# Patient Record
Sex: FEMALE | Race: WHITE | Employment: OTHER | URBAN - METROPOLITAN AREA
[De-identification: names, ages, dates, MRNs, and addresses within clinical notes are randomized per-mention and may not be internally consistent; named-entity substitution may affect disease eponyms.]

---

## 2017-02-16 RX ORDER — MEMANTINE HYDROCHLORIDE 5 MG/1
TABLET ORAL
Qty: 60 TABLET | Refills: 0 | Status: SHIPPED | OUTPATIENT
Start: 2017-02-16 | End: 2017-03-10 | Stop reason: SDUPTHER

## 2017-03-12 RX ORDER — DONEPEZIL HYDROCHLORIDE 10 MG/1
TABLET, FILM COATED ORAL
Qty: 30 TABLET | Refills: 5 | Status: SHIPPED | OUTPATIENT
Start: 2017-03-12 | End: 2017-10-04 | Stop reason: SDUPTHER

## 2017-03-12 RX ORDER — MEMANTINE HYDROCHLORIDE 5 MG/1
TABLET ORAL
Qty: 60 TABLET | Refills: 5 | Status: SHIPPED | OUTPATIENT
Start: 2017-03-12 | End: 2017-10-09 | Stop reason: SDUPTHER

## 2017-09-21 ENCOUNTER — OFFICE VISIT (OUTPATIENT)
Dept: FAMILY MEDICINE CLINIC | Age: 82
End: 2017-09-21

## 2017-09-21 VITALS
HEART RATE: 74 BPM | DIASTOLIC BLOOD PRESSURE: 84 MMHG | HEIGHT: 62 IN | BODY MASS INDEX: 23.55 KG/M2 | OXYGEN SATURATION: 97 % | WEIGHT: 128 LBS | RESPIRATION RATE: 18 BRPM | SYSTOLIC BLOOD PRESSURE: 128 MMHG | TEMPERATURE: 98 F

## 2017-09-21 DIAGNOSIS — L85.8 CUTANEOUS HORN: ICD-10-CM

## 2017-09-21 DIAGNOSIS — Z23 NEED FOR INFLUENZA VACCINATION: ICD-10-CM

## 2017-09-21 DIAGNOSIS — J44.9 CHRONIC OBSTRUCTIVE PULMONARY DISEASE, UNSPECIFIED COPD TYPE (HCC): ICD-10-CM

## 2017-09-21 DIAGNOSIS — L23.9 ALLERGIC CONTACT DERMATITIS, UNSPECIFIED TRIGGER: ICD-10-CM

## 2017-09-21 DIAGNOSIS — C44.729 SQUAMOUS CELL CARCINOMA, LEG, LEFT: Primary | ICD-10-CM

## 2017-09-21 PROCEDURE — 99213 OFFICE O/P EST LOW 20 MIN: CPT | Performed by: FAMILY MEDICINE

## 2017-09-21 PROCEDURE — G8926 SPIRO NO PERF OR DOC: HCPCS | Performed by: FAMILY MEDICINE

## 2017-09-21 PROCEDURE — 1123F ACP DISCUSS/DSCN MKR DOCD: CPT | Performed by: FAMILY MEDICINE

## 2017-09-21 PROCEDURE — G0008 ADMIN INFLUENZA VIRUS VAC: HCPCS | Performed by: FAMILY MEDICINE

## 2017-09-21 PROCEDURE — G8399 PT W/DXA RESULTS DOCUMENT: HCPCS | Performed by: FAMILY MEDICINE

## 2017-09-21 PROCEDURE — G8420 CALC BMI NORM PARAMETERS: HCPCS | Performed by: FAMILY MEDICINE

## 2017-09-21 PROCEDURE — 4040F PNEUMOC VAC/ADMIN/RCVD: CPT | Performed by: FAMILY MEDICINE

## 2017-09-21 PROCEDURE — 3023F SPIROM DOC REV: CPT | Performed by: FAMILY MEDICINE

## 2017-09-21 PROCEDURE — G8427 DOCREV CUR MEDS BY ELIG CLIN: HCPCS | Performed by: FAMILY MEDICINE

## 2017-09-21 PROCEDURE — 4004F PT TOBACCO SCREEN RCVD TLK: CPT | Performed by: FAMILY MEDICINE

## 2017-09-21 PROCEDURE — 1090F PRES/ABSN URINE INCON ASSESS: CPT | Performed by: FAMILY MEDICINE

## 2017-09-21 PROCEDURE — 90662 IIV NO PRSV INCREASED AG IM: CPT | Performed by: FAMILY MEDICINE

## 2017-09-21 RX ORDER — MOMETASONE FUROATE 1 MG/G
CREAM TOPICAL
Qty: 45 G | Refills: 2 | Status: ON HOLD | OUTPATIENT
Start: 2017-09-21 | End: 2017-11-20 | Stop reason: ALTCHOICE

## 2017-09-21 RX ORDER — CEPHALEXIN 500 MG/1
500 CAPSULE ORAL 3 TIMES DAILY
Qty: 30 CAPSULE | Refills: 0 | Status: ON HOLD | OUTPATIENT
Start: 2017-09-21 | End: 2017-11-20 | Stop reason: ALTCHOICE

## 2017-09-21 ASSESSMENT — ENCOUNTER SYMPTOMS
RHINORRHEA: 0
COUGH: 0
CHEST TIGHTNESS: 0
RESPIRATORY NEGATIVE: 1
GASTROINTESTINAL NEGATIVE: 1
EYES NEGATIVE: 1

## 2017-10-04 RX ORDER — DONEPEZIL HYDROCHLORIDE 10 MG/1
TABLET, FILM COATED ORAL
Qty: 30 TABLET | Refills: 5 | Status: SHIPPED | OUTPATIENT
Start: 2017-10-04 | End: 2018-03-09 | Stop reason: SDUPTHER

## 2017-10-06 ENCOUNTER — TELEPHONE (OUTPATIENT)
Dept: FAMILY MEDICINE CLINIC | Age: 82
End: 2017-10-06

## 2017-10-06 DIAGNOSIS — C44.90 SKIN CANCER: Primary | ICD-10-CM

## 2017-10-06 NOTE — TELEPHONE ENCOUNTER
Patients' son, Myra Pratt contacted the office indicating he had his mother in for an appointment with Dr. Blanca Eldridge 10- however they waited in the waiting room for approximately 36 - 45 minutes before leaving indicating his elderly mother has incontinence issues and was unable to wait any longer. Myra Pratt indicated other patients' that arrived before them were already waiting upwards to 1 to 1 1/2 hours for their appointment. Please advise an alternative plastic surgeon for skin CA.

## 2017-10-09 RX ORDER — MEMANTINE HYDROCHLORIDE 5 MG/1
TABLET ORAL
Qty: 60 TABLET | Refills: 5 | Status: ON HOLD | OUTPATIENT
Start: 2017-10-09 | End: 2017-11-20 | Stop reason: ALTCHOICE

## 2017-11-20 ENCOUNTER — HOSPITAL ENCOUNTER (OUTPATIENT)
Age: 82
Setting detail: OUTPATIENT SURGERY
Discharge: HOME | End: 2017-11-20
Attending: SURGERY | Admitting: SURGERY
Payer: MEDICARE

## 2017-11-20 VITALS
WEIGHT: 126 LBS | OXYGEN SATURATION: 95 % | DIASTOLIC BLOOD PRESSURE: 69 MMHG | HEIGHT: 62 IN | HEART RATE: 71 BPM | RESPIRATION RATE: 20 BRPM | TEMPERATURE: 100.1 F | SYSTOLIC BLOOD PRESSURE: 157 MMHG | BODY MASS INDEX: 23.19 KG/M2

## 2017-11-20 PROBLEM — C44.99: Chronic | Status: ACTIVE | Noted: 2017-11-20

## 2017-11-20 PROCEDURE — 3600000002 HC SURGERY LEVEL 2 BASE: Performed by: SURGERY

## 2017-11-20 PROCEDURE — 2500000003 HC RX 250 WO HCPCS: Performed by: SURGERY

## 2017-11-20 PROCEDURE — 3600000012 HC SURGERY LEVEL 2 ADDTL 15MIN: Performed by: SURGERY

## 2017-11-20 PROCEDURE — 88342 IMHCHEM/IMCYTCHM 1ST ANTB: CPT

## 2017-11-20 PROCEDURE — A6223 GAUZE >16<=48 NO W/SAL W/O B: HCPCS | Performed by: SURGERY

## 2017-11-20 PROCEDURE — 2580000003 HC RX 258: Performed by: SURGERY

## 2017-11-20 PROCEDURE — 88305 TISSUE EXAM BY PATHOLOGIST: CPT

## 2017-11-20 PROCEDURE — 88341 IMHCHEM/IMCYTCHM EA ADD ANTB: CPT

## 2017-11-20 RX ORDER — MAGNESIUM HYDROXIDE 1200 MG/15ML
LIQUID ORAL CONTINUOUS PRN
Status: DISCONTINUED | OUTPATIENT
Start: 2017-11-20 | End: 2017-11-20 | Stop reason: HOSPADM

## 2017-11-20 RX ORDER — BUPIVACAINE HYDROCHLORIDE 5 MG/ML
INJECTION, SOLUTION EPIDURAL; INTRACAUDAL PRN
Status: DISCONTINUED | OUTPATIENT
Start: 2017-11-20 | End: 2017-11-20 | Stop reason: HOSPADM

## 2017-11-20 RX ORDER — LOPERAMIDE HYDROCHLORIDE 2 MG/1
2 CAPSULE ORAL 4 TIMES DAILY PRN
COMMUNITY
End: 2020-01-08 | Stop reason: SDUPTHER

## 2017-11-20 RX ORDER — LIDOCAINE HYDROCHLORIDE AND EPINEPHRINE 10; 10 MG/ML; UG/ML
INJECTION, SOLUTION INFILTRATION; PERINEURAL PRN
Status: DISCONTINUED | OUTPATIENT
Start: 2017-11-20 | End: 2017-11-20 | Stop reason: HOSPADM

## 2017-11-20 RX ORDER — MEMANTINE HYDROCHLORIDE 21 MG/1
21 CAPSULE, EXTENDED RELEASE ORAL DAILY
Status: ON HOLD | COMMUNITY
End: 2020-02-28

## 2018-02-13 ENCOUNTER — OFFICE VISIT (OUTPATIENT)
Dept: FAMILY MEDICINE CLINIC | Age: 83
End: 2018-02-13
Payer: MEDICARE

## 2018-02-13 VITALS
HEART RATE: 74 BPM | HEIGHT: 62 IN | DIASTOLIC BLOOD PRESSURE: 64 MMHG | TEMPERATURE: 98.1 F | OXYGEN SATURATION: 97 % | SYSTOLIC BLOOD PRESSURE: 112 MMHG | RESPIRATION RATE: 18 BRPM

## 2018-02-13 DIAGNOSIS — J44.9 CHRONIC OBSTRUCTIVE PULMONARY DISEASE, UNSPECIFIED COPD TYPE (HCC): ICD-10-CM

## 2018-02-13 DIAGNOSIS — H61.23 BILATERAL IMPACTED CERUMEN: Primary | ICD-10-CM

## 2018-02-13 DIAGNOSIS — H91.93 BILATERAL HEARING LOSS, UNSPECIFIED HEARING LOSS TYPE: ICD-10-CM

## 2018-02-13 PROCEDURE — 4004F PT TOBACCO SCREEN RCVD TLK: CPT | Performed by: FAMILY MEDICINE

## 2018-02-13 PROCEDURE — 3023F SPIROM DOC REV: CPT | Performed by: FAMILY MEDICINE

## 2018-02-13 PROCEDURE — 1123F ACP DISCUSS/DSCN MKR DOCD: CPT | Performed by: FAMILY MEDICINE

## 2018-02-13 PROCEDURE — G8926 SPIRO NO PERF OR DOC: HCPCS | Performed by: FAMILY MEDICINE

## 2018-02-13 PROCEDURE — G8427 DOCREV CUR MEDS BY ELIG CLIN: HCPCS | Performed by: FAMILY MEDICINE

## 2018-02-13 PROCEDURE — G8484 FLU IMMUNIZE NO ADMIN: HCPCS | Performed by: FAMILY MEDICINE

## 2018-02-13 PROCEDURE — G8399 PT W/DXA RESULTS DOCUMENT: HCPCS | Performed by: FAMILY MEDICINE

## 2018-02-13 PROCEDURE — 1090F PRES/ABSN URINE INCON ASSESS: CPT | Performed by: FAMILY MEDICINE

## 2018-02-13 PROCEDURE — G8420 CALC BMI NORM PARAMETERS: HCPCS | Performed by: FAMILY MEDICINE

## 2018-02-13 PROCEDURE — 99213 OFFICE O/P EST LOW 20 MIN: CPT | Performed by: FAMILY MEDICINE

## 2018-02-13 PROCEDURE — 4040F PNEUMOC VAC/ADMIN/RCVD: CPT | Performed by: FAMILY MEDICINE

## 2018-02-13 ASSESSMENT — ENCOUNTER SYMPTOMS
RESPIRATORY NEGATIVE: 1
COUGH: 0
RHINORRHEA: 0
EYES NEGATIVE: 1
GASTROINTESTINAL NEGATIVE: 1
CHEST TIGHTNESS: 0

## 2018-02-14 NOTE — PROGRESS NOTES
visit. Current Outpatient Prescriptions on File Prior to Visit   Medication Sig Dispense Refill    memantine ER (NAMENDA XR) 21 MG CP24 extended release capsule Take 21 mg by mouth daily      loperamide (IMODIUM) 2 MG capsule Take 2 mg by mouth 4 times daily as needed for Diarrhea      donepezil (ARICEPT) 10 MG tablet TAKE 1 TABLET BY MOUTH DAILY 30 tablet 5     No current facility-administered medications on file prior to visit. Allergies   Allergen Reactions    Penicillins      Health Maintenance   Topic Date Due    DTaP/Tdap/Td vaccine (1 - Tdap) 10/06/1952    Pneumococcal low/med risk (2 of 2 - PCV13) 09/03/2015    Zostavax vaccine  Completed    DEXA (modify frequency per FRAX score)  Addressed    Flu vaccine  Completed       Review of Systems    Review of Systems   Constitutional: Negative for activity change, appetite change, fatigue and fever. HENT: Positive for hearing loss. Negative for congestion and rhinorrhea. Eyes: Negative. Respiratory: Negative. Negative for cough and chest tightness. Cardiovascular: Negative. Gastrointestinal: Negative. Endocrine: Negative. Genitourinary: Negative. Musculoskeletal: Negative. Skin: Negative. Neurological: Negative for dizziness, light-headedness and numbness. Hematological: Negative. Psychiatric/Behavioral: Negative. Physical Exam  Vitals:    02/13/18 1613   BP: 112/64   Pulse: 74   Resp: 18   Temp: 98.1 °F (36.7 °C)   TempSrc: Tympanic   SpO2: 97%   Height: 5' 2\" (1.575 m)       Physical Exam   Constitutional: She appears well-developed and well-nourished. HENT:   Right Ear: External ear normal.   Left Ear: External ear normal.   Ears:    Eyes: Conjunctivae are normal. Pupils are equal, round, and reactive to light. Neck: Normal range of motion. Neck supple. No thyromegaly present. Cardiovascular: Normal rate, regular rhythm and normal heart sounds. No murmur heard.   Pulmonary/Chest: Effort normal and breath sounds normal.   Abdominal: Soft. Bowel sounds are normal. She exhibits no distension. There is no tenderness. Musculoskeletal: Normal range of motion. She exhibits no edema or tenderness. Lymphadenopathy:     She has no cervical adenopathy. Neurological: She is alert. No cranial nerve deficit. Coordination normal.       Assessment  1. Bilateral impacted cerumen     2. Bilateral hearing loss, unspecified hearing loss type     3. Chronic obstructive pulmonary disease, unspecified COPD type ([facility] [address]       Problem List     None          Plan  If hearing loss gets worse consider ent referal  No orders of the defined types were placed in this encounter. No Follow-up on file.   Barbra Madrid MD

## 2018-03-12 RX ORDER — MEMANTINE HYDROCHLORIDE 5 MG/1
TABLET ORAL
Qty: 60 TABLET | Refills: 5 | Status: SHIPPED | OUTPATIENT
Start: 2018-03-12 | End: 2018-08-29 | Stop reason: SDUPTHER

## 2018-03-12 RX ORDER — DONEPEZIL HYDROCHLORIDE 10 MG/1
TABLET, FILM COATED ORAL
Qty: 30 TABLET | Refills: 5 | Status: SHIPPED | OUTPATIENT
Start: 2018-03-12 | End: 2018-09-03 | Stop reason: SDUPTHER

## 2018-08-27 ENCOUNTER — APPOINTMENT (OUTPATIENT)
Dept: GENERAL RADIOLOGY | Age: 83
End: 2018-08-27
Payer: MEDICARE

## 2018-08-27 ENCOUNTER — HOSPITAL ENCOUNTER (EMERGENCY)
Age: 83
Discharge: HOME | End: 2018-08-27
Payer: MEDICARE

## 2018-08-27 ENCOUNTER — APPOINTMENT (OUTPATIENT)
Dept: CT IMAGING | Age: 83
End: 2018-08-27
Payer: MEDICARE

## 2018-08-27 VITALS
DIASTOLIC BLOOD PRESSURE: 88 MMHG | TEMPERATURE: 98.1 F | BODY MASS INDEX: 27.44 KG/M2 | HEART RATE: 81 BPM | SYSTOLIC BLOOD PRESSURE: 138 MMHG | WEIGHT: 150 LBS | OXYGEN SATURATION: 98 % | RESPIRATION RATE: 16 BRPM

## 2018-08-27 DIAGNOSIS — R07.81 RIB PAIN ON LEFT SIDE: ICD-10-CM

## 2018-08-27 DIAGNOSIS — S80.02XA CONTUSION OF LEFT KNEE, INITIAL ENCOUNTER: ICD-10-CM
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
## 2018-08-27 DIAGNOSIS — S60.222A CONTUSION OF LEFT HAND, INITIAL ENCOUNTER: ICD-10-CM

## 2018-08-27 DIAGNOSIS — M25.512 ACUTE PAIN OF LEFT SHOULDER: Primary | ICD-10-CM

## 2018-08-27 PROCEDURE — 73130 X-RAY EXAM OF HAND: CPT

## 2018-08-27 PROCEDURE — 72072 X-RAY EXAM THORAC SPINE 3VWS: CPT

## 2018-08-27 PROCEDURE — 99284 EMERGENCY DEPT VISIT MOD MDM: CPT

## 2018-08-27 PROCEDURE — 73564 X-RAY EXAM KNEE 4 OR MORE: CPT

## 2018-08-27 PROCEDURE — 70450 CT HEAD/BRAIN W/O DYE: CPT

## 2018-08-27 PROCEDURE — 73030 X-RAY EXAM OF SHOULDER: CPT

## 2018-08-27 ASSESSMENT — PAIN DESCRIPTION - PAIN TYPE: TYPE: ACUTE PAIN

## 2018-08-27 ASSESSMENT — PAIN - FUNCTIONAL ASSESSMENT: PAIN_FUNCTIONAL_ASSESSMENT: 0-10

## 2018-08-27 ASSESSMENT — ENCOUNTER SYMPTOMS
BACK PAIN: 0
EYE DISCHARGE: 0
APNEA: 0
VOICE CHANGE: 0
NAUSEA: 0
ABDOMINAL PAIN: 0
VOMITING: 0
ANAL BLEEDING: 0
ABDOMINAL DISTENTION: 0
PHOTOPHOBIA: 0

## 2018-08-27 ASSESSMENT — PAIN DESCRIPTION - LOCATION: LOCATION: SHOULDER

## 2018-08-27 ASSESSMENT — PAIN DESCRIPTION - FREQUENCY: FREQUENCY: CONTINUOUS

## 2018-08-27 ASSESSMENT — PAIN DESCRIPTION - DESCRIPTORS: DESCRIPTORS: THROBBING;NAGGING

## 2018-08-27 ASSESSMENT — PAIN DESCRIPTION - ORIENTATION: ORIENTATION: LEFT

## 2018-08-27 ASSESSMENT — PAIN SCALES - GENERAL
PAINLEVEL_OUTOF10: 8
PAINLEVEL_OUTOF10: 4

## 2018-08-28 NOTE — ED PROVIDER NOTES
Psychiatric/Behavioral: Negative for behavioral problems, self-injury and sleep disturbance. All other systems reviewed and are negative. Except as noted above the remainder of the review of systems was reviewed and negative. PAST MEDICAL HISTORY     Past Medical History:   Diagnosis Date    COPD (chronic obstructive pulmonary disease) ([facility] [address]     GI bleed          SURGICAL HISTORY       Past Surgical History:   Procedure Laterality Date    APPENDECTOMY      CARPAL TUNNEL RELEASE      IN INCIS/DRAIN THIGH/KNEE ABSCESS,DEEP Left 11/20/2017    EXCISION AND LAYERED PLASTIC CLOSURE OF BCC LEFT ANTERIOR PICKENS performed by Anna Fox MD at [address] [place] [address] AND ADENOIDECTOMY           CURRENT MEDICATIONS       Previous Medications    DONEPEZIL (ARICEPT) 10 MG TABLET    TAKE 1 TABLET BY MOUTH DAILY    LOPERAMIDE (IMODIUM) 2 MG CAPSULE    Take 2 mg by mouth 4 times daily as needed for Diarrhea    MEMANTINE (NAMENDA) 5 MG TABLET    TAKE 1 TABLET BY MOUTH TWICE DAILY    MEMANTINE ER (NAMENDA XR) 21 MG CP24 EXTENDED RELEASE CAPSULE    Take 21 mg by mouth daily       ALLERGIES     Penicillins    FAMILY HISTORY       Family History   Problem Relation Age of Onset    Cancer Son         ESOPHAGEAL          SOCIAL HISTORY       Social History     Social History    Marital status:       Spouse name: N/A    Number of children: N/A    Years of education: N/A     Social History Main Topics    Smoking status: Current Every Day Smoker    Smokeless tobacco: Never Used    Alcohol use No    Drug use: No    Sexual activity: Not Asked     Other Topics Concern    None     Social History Narrative    None       SCREENINGS             PHYSICAL EXAM    (up to 7 for level 4, 8 or more for level 5)     ED Triage Vitals [08/27/18 1951]   BP Temp Temp Source Pulse Resp SpO2 Height Weight   (!) 155/97 98.1 °F (36.7 °C) Oral 82 18 98 % -- 150 lb (68 kg)       Physical Exam Constitutional: She is oriented to person, place, and time. She appears well-developed and well-nourished. No distress. HENT:   Head: Normocephalic and atraumatic. Eyes: Pupils are equal, round, and reactive to light. Right eye exhibits no discharge. Left eye exhibits no discharge. Neck: Normal range of motion. Neck supple. Cardiovascular: Normal rate, regular rhythm, normal heart sounds and intact distal pulses. Pulmonary/Chest: Effort normal and breath sounds normal. No stridor. No respiratory distress. Abdominal: Soft. Bowel sounds are normal. She exhibits no distension. Musculoskeletal: She exhibits tenderness. Left proximal humerus slight tenderness bruising noted overlying left fifth and fourth digits. Negative tenderness. Patient noted to have left rib tenderness anteriorly mid thoracic tenderness. Negative cervical tenderness patient has full range of motion neck. Patient has negative tenderness overlying hips she has positive left knee erythema slight abrasion and tenderness. Negative right leg tenderness negative right arm tenderness. Neurological: She is alert and oriented to person, place, and time. Skin: Skin is warm. There is erythema. Psychiatric: She has a normal mood and affect. Nursing note and vitals reviewed.       DIAGNOSTIC RESULTS     EKG: All EKG's are interpreted by the Emergency Department Physician who either signs or Co-signs this chart in the absence of a cardiologist.         RADIOLOGY:   Non-plain film images such as CT, Ultrasound and MRI are read by the radiologist. Plain radiographic images are visualized and preliminarily interpreted by the emergency physician with the below findings:    See rad report    Interpretation per the Radiologist below, if available at the time of this note:    CT Head WO Contrast    (Results Pending)   XR SHOULDER LEFT (MIN 2 VIEWS)    (Results Pending)   XR THORACIC SPINE (3 VIEWS)    (Results Pending)   XR HAND LEFT (MIN 3 VIEWS)    (Results Pending)   XR KNEE LEFT (MIN 4 VIEWS)    (Results Pending)   XR RIBS LEFT INCLUDE CHEST (MIN 3 VIEWS)    (Results Pending)         ED BEDSIDE ULTRASOUND:   Performed by ED Physician - none    LABS:  Labs Reviewed - No data to display    All other labs were within normal range or not returned as of this dictation. EMERGENCY DEPARTMENT COURSE and DIFFERENTIAL DIAGNOSIS/MDM:   Vitals:    Vitals:    08/27/18 1951 08/27/18 2214   BP: (!) 155/97 138/88   Pulse: 82 81   Resp: 18 16   Temp: 98.1 °F (36.7 °C)    TempSrc: Oral    SpO2: 98% 98%   Weight: 150 lb (68 kg)             MDM  Number of Diagnoses or Management Options  Acute pain of left shoulder:   Contusion of left hand, initial encounter:   Contusion of left knee, initial encounter:   Fall, initial encounter:   Rib pain on left side:   Diagnosis management comments: Family notes patient has dementia. Given patient's age unwitnessed fall will CAT scan had x-rays. Ordered rib series patient went to x-ray. X-ray patient refused that his chest or shoulder. We'll discuss with family including falls precautions follow-up with primary care physician and return to ER if any symptoms worsen or new symptoms develop discussed with Dr. Annamarie Oliva. . S with patient family they note that they're taking her to internal medicine on Wednesday to discuss her current condition and discuss options for care. We discussed follow-up with orthopedics return to ER if any symptoms worsen or new symptoms develop. Family also notes the power of  notes that they're aware that she refused rib x-rays. Amount and/or Complexity of Data Reviewed  Tests in the radiology section of CPT®: ordered and reviewed  Obtain history from someone other than the patient: yes  Discuss the patient with other providers: yes        CRITICAL CARE TIME   Total Critical Care time was   minutes, excluding separately reportable procedures.   There was a high probability of

## 2018-08-28 NOTE — ED NOTES
Sammy High (Granddaughter): [phone] Wants to be called if patient becomes difficult to handle. Sophie Marrufo RN  08/27/18 2027

## 2018-08-29 ENCOUNTER — CARE COORDINATION (OUTPATIENT)
Dept: CARE COORDINATION | Age: 83
End: 2018-08-29

## 2018-08-29 ENCOUNTER — OFFICE VISIT (OUTPATIENT)
Dept: INTERNAL MEDICINE CLINIC | Age: 83
End: 2018-08-29
Payer: MEDICARE

## 2018-08-29 VITALS
DIASTOLIC BLOOD PRESSURE: 78 MMHG | HEART RATE: 73 BPM | RESPIRATION RATE: 18 BRPM | SYSTOLIC BLOOD PRESSURE: 130 MMHG | TEMPERATURE: 98.5 F | OXYGEN SATURATION: 95 % | HEIGHT: 62 IN | WEIGHT: 127 LBS | BODY MASS INDEX: 23.37 KG/M2

## 2018-08-29 DIAGNOSIS — R07.81 RIB PAIN ON LEFT SIDE: Primary | ICD-10-CM

## 2018-08-29 DIAGNOSIS — Z91.81 AT HIGH RISK FOR FALLS: ICD-10-CM

## 2018-08-29 DIAGNOSIS — F02.80 LATE ONSET ALZHEIMER'S DISEASE WITHOUT BEHAVIORAL DISTURBANCE (HCC): ICD-10-CM

## 2018-08-29 DIAGNOSIS — G30.1 LATE ONSET ALZHEIMER'S DISEASE WITHOUT BEHAVIORAL DISTURBANCE (HCC): ICD-10-CM

## 2018-08-29 DIAGNOSIS — J44.9 CHRONIC OBSTRUCTIVE PULMONARY DISEASE, UNSPECIFIED COPD TYPE (HCC): ICD-10-CM

## 2018-08-29 DIAGNOSIS — Z23 NEED FOR SHINGLES VACCINE: ICD-10-CM

## 2018-08-29 PROCEDURE — G8926 SPIRO NO PERF OR DOC: HCPCS | Performed by: FAMILY MEDICINE

## 2018-08-29 PROCEDURE — 1090F PRES/ABSN URINE INCON ASSESS: CPT | Performed by: FAMILY MEDICINE

## 2018-08-29 PROCEDURE — 4040F PNEUMOC VAC/ADMIN/RCVD: CPT | Performed by: FAMILY MEDICINE

## 2018-08-29 PROCEDURE — 1101F PT FALLS ASSESS-DOCD LE1/YR: CPT | Performed by: FAMILY MEDICINE

## 2018-08-29 PROCEDURE — 1123F ACP DISCUSS/DSCN MKR DOCD: CPT | Performed by: FAMILY MEDICINE

## 2018-08-29 PROCEDURE — G8399 PT W/DXA RESULTS DOCUMENT: HCPCS | Performed by: FAMILY MEDICINE

## 2018-08-29 PROCEDURE — G8420 CALC BMI NORM PARAMETERS: HCPCS | Performed by: FAMILY MEDICINE

## 2018-08-29 PROCEDURE — 4004F PT TOBACCO SCREEN RCVD TLK: CPT | Performed by: FAMILY MEDICINE

## 2018-08-29 PROCEDURE — 3023F SPIROM DOC REV: CPT | Performed by: FAMILY MEDICINE

## 2018-08-29 PROCEDURE — 99213 OFFICE O/P EST LOW 20 MIN: CPT | Performed by: FAMILY MEDICINE

## 2018-08-29 PROCEDURE — G8427 DOCREV CUR MEDS BY ELIG CLIN: HCPCS | Performed by: FAMILY MEDICINE

## 2018-08-29 RX ORDER — MEMANTINE HYDROCHLORIDE 10 MG/1
10 TABLET ORAL 2 TIMES DAILY
Qty: 60 TABLET | Refills: 5 | Status: SHIPPED | OUTPATIENT
Start: 2018-08-29 | End: 2019-02-21 | Stop reason: SDUPTHER

## 2018-08-29 ASSESSMENT — PATIENT HEALTH QUESTIONNAIRE - PHQ9
SUM OF ALL RESPONSES TO PHQ9 QUESTIONS 1 & 2: 0
SUM OF ALL RESPONSES TO PHQ QUESTIONS 1-9: 0
SUM OF ALL RESPONSES TO PHQ QUESTIONS 1-9: 0
2. FEELING DOWN, DEPRESSED OR HOPELESS: 0
1. LITTLE INTEREST OR PLEASURE IN DOING THINGS: 0

## 2018-08-29 ASSESSMENT — ENCOUNTER SYMPTOMS
RESPIRATORY NEGATIVE: 1
CHEST TIGHTNESS: 0
EYES NEGATIVE: 1
COUGH: 0
GASTROINTESTINAL NEGATIVE: 1
RHINORRHEA: 0

## 2018-08-29 NOTE — CARE COORDINATION
[address]  Telephone call to [phone] [facility]. Relayed patient information needed for coordination of nursing home placement. Rodney Casey relayed typically Medicaid has to be approved before admission occurs. She asked that son contact her for discussion of placement of patient.

## 2018-08-29 NOTE — PROGRESS NOTES
Patient is seen in follow up for   Chief Complaint   Patient presents with    Diarrhea     POA , Son and ex-daughter in law here stating patient has been having bouts of diarrhea that is worsening     Health Maintenance     prevnar and shingrix ordered     Follow-Up from Hospital     Patient went to ER on 8/27/18- she fell. Unsure how she fell. Hurt her left shoulder. POA states patient refused the ER to xray her ribs.  Other     GAP      HPI here for follow up after fall with shoulder pain and rib pain. Past Medical History:   Diagnosis Date    COPD [facility] obstructive [facility] disease) [facility] [address]     GI bleed      Patient Active Problem List    Diagnosis Date Noted    Carcinoma of skin 11/20/2017    Stool incontinence 06/04/2014    Tobacco abuse 03/24/2014    Lactase deficiency 02/10/2014    Ataxia 11/11/2013    Tremor 11/11/2013    History of GI bleed 08/19/2013    Chronic bronchitis ([facility] [address] 05/13/2013    Alzheimer's dementia 04/30/2012    B12 deficiency 04/30/2012    Chronic diarrhea 04/30/2012    COPD (chronic obstructive pulmonary disease) ([facility] [address]      Past Surgical History:   Procedure Laterality Date    APPENDECTOMY      CARPAL TUNNEL RELEASE      MT INCIS/DRAIN THIGH/KNEE ABSCESS,DEEP Left 11/20/2017    EXCISION AND LAYERED PLASTIC CLOSURE OF BCC LEFT ANTERIOR PICKENS performed by Tori Brock MD at [address] [place] [address]       Family History   Problem Relation Age of Onset    Cancer Son         ESOPHAGEAL     Social History     Social History    Marital status:       Spouse name: N/A    Number of children: N/A    Years of education: N/A     Social History Main Topics    Smoking status: Current Every Day Smoker    Smokeless tobacco: Never Used    Alcohol use No    Drug use: No    Sexual activity: Not Asked     Other Topics Concern    None     Social History Narrative    None     Current Outpatient Prescriptions   Medication Sig Question:   Reason for exam:     Answer:   chest wall pain    XR CHEST STANDARD (2 VW)     Standing Status:   Future     Standing Expiration Date:   8/29/2019     Order Specific Question:   Reason for exam:     Answer:   fall with left rib pain    PREVNAR 13 IM (Pneumococcal conjugate vaccine 13-valent)     Orders Placed This Encounter   Medications    zoster recombinant adjuvanted vaccine (SHINGRIX) 50 MCG SUSR injection     Sig: Inject 0.5 mLs into the muscle once for 1 dose     Dispense:  0.5 mL     Refill:  1    memantine (NAMENDA) 10 MG tablet     Sig: Take 1 tablet by mouth 2 times daily     Dispense:  60 tablet     Refill:  5     Family wants patient placed in a  nursing home will discuss with the .   Junior Augustus MD

## 2018-08-30 ENCOUNTER — CARE COORDINATION (OUTPATIENT)
Dept: CARE COORDINATION | Age: 83
End: 2018-08-30

## 2018-08-30 NOTE — CARE COORDINATION
0900  Met with son/Hemant today at this writer's office. Provided son with Medicaid application and list of long-term care facilities. He clarified that patient is in need of long-term care permanently. Educated son to Medicaid process. Encouraged on to contact Nancy Murrieta at [facility]. Family can no longer provide care for patient. Son asking what if patient refuses to go to a facility. Discussed that [facility] would not be enough to keep patient in a long-term facility. Discussed need for guardianship to maintain patient in long-term facility. Laura Tariq discussed plan to contact Adult Protective Services to discuss  Past money issue with daughter. Also, encouraged son to discuss issue with guardianship with Adult Protective Services. Patient has lifeline in place but son feels she would not activate system for assistance.

## 2018-09-04 RX ORDER — DONEPEZIL HYDROCHLORIDE 10 MG/1
TABLET, FILM COATED ORAL
Qty: 30 TABLET | Refills: 5 | Status: SHIPPED | OUTPATIENT
Start: 2018-09-04 | End: 2019-03-21 | Stop reason: SDUPTHER

## 2018-09-21 ENCOUNTER — CARE COORDINATION (OUTPATIENT)
Dept: CARE COORDINATION | Age: 83
End: 2018-09-21

## 2018-10-23 ENCOUNTER — CARE COORDINATION (OUTPATIENT)
Dept: CARE COORDINATION | Age: 83
End: 2018-10-23

## 2018-10-23 NOTE — CARE COORDINATION
Telephone call to son/Hemant. Left voicemail of nature of call with request for return call. Call back number was provided.

## 2018-11-01 ENCOUNTER — CARE COORDINATION (OUTPATIENT)
Dept: CARE COORDINATION | Age: 83
End: 2018-11-01

## 2018-11-25 ENCOUNTER — CARE COORDINATION (OUTPATIENT)
Dept: CARE COORDINATION | Age: 83
End: 2018-11-25

## 2018-12-06 ENCOUNTER — CARE COORDINATION (OUTPATIENT)
Dept: CARE COORDINATION | Age: 83
End: 2018-12-06

## 2019-02-06 ENCOUNTER — CARE COORDINATION (OUTPATIENT)
Dept: CARE COORDINATION | Age: 84
End: 2019-02-06

## 2019-02-21 RX ORDER — MEMANTINE HYDROCHLORIDE 10 MG/1
10 TABLET ORAL 2 TIMES DAILY
Qty: 60 TABLET | Refills: 3 | Status: SHIPPED | OUTPATIENT
Start: 2019-02-21 | End: 2019-06-09 | Stop reason: SDUPTHER

## 2019-03-21 RX ORDER — DONEPEZIL HYDROCHLORIDE 10 MG/1
TABLET, FILM COATED ORAL
Qty: 30 TABLET | Refills: 5 | Status: SHIPPED | OUTPATIENT
Start: 2019-03-21 | End: 2019-09-15 | Stop reason: SDUPTHER

## 2019-03-29 ENCOUNTER — CARE COORDINATION (OUTPATIENT)
Dept: CARE COORDINATION | Age: 84
End: 2019-03-29

## 2019-06-10 RX ORDER — MEMANTINE HYDROCHLORIDE 10 MG/1
10 TABLET ORAL 2 TIMES DAILY
Qty: 60 TABLET | Refills: 3 | Status: SHIPPED | OUTPATIENT
Start: 2019-06-10 | End: 2019-11-01 | Stop reason: SDUPTHER

## 2019-06-10 NOTE — TELEPHONE ENCOUNTER
Requesting medication refill. Please approve or deny this request.    Rx requested:  Requested Prescriptions     Pending Prescriptions Disp Refills    memantine (NAMENDA) 10 MG tablet [Pharmacy Med Name: MEMANTINE HCL 10 MG TABLET] 60 tablet 3     Sig: Take 1 tablet by mouth 2 times daily       Last Office Visit:   8/29/2018    Last Labs:      Next Visit Date:  No future appointments.

## 2019-09-16 RX ORDER — DONEPEZIL HYDROCHLORIDE 10 MG/1
TABLET, FILM COATED ORAL
Qty: 30 TABLET | Refills: 0 | Status: SHIPPED | OUTPATIENT
Start: 2019-09-16 | End: 2019-10-09 | Stop reason: SDUPTHER

## 2019-10-09 RX ORDER — DONEPEZIL HYDROCHLORIDE 10 MG/1
TABLET, FILM COATED ORAL
Qty: 30 TABLET | Refills: 0 | Status: SHIPPED | OUTPATIENT
Start: 2019-10-09 | End: 2019-12-17 | Stop reason: SDUPTHER

## 2019-11-01 RX ORDER — MEMANTINE HYDROCHLORIDE 10 MG/1
10 TABLET ORAL 2 TIMES DAILY
Qty: 60 TABLET | Refills: 0 | Status: SHIPPED | OUTPATIENT
Start: 2019-11-01 | End: 2019-12-17 | Stop reason: SDUPTHER

## 2019-12-04 RX ORDER — MEMANTINE HYDROCHLORIDE 10 MG/1
10 TABLET ORAL 2 TIMES DAILY
Qty: 60 TABLET | Refills: 0 | OUTPATIENT
Start: 2019-12-04

## 2019-12-04 RX ORDER — DONEPEZIL HYDROCHLORIDE 10 MG/1
TABLET, FILM COATED ORAL
Qty: 30 TABLET | Refills: 0 | OUTPATIENT
Start: 2019-12-04

## 2019-12-17 RX ORDER — MEMANTINE HYDROCHLORIDE 10 MG/1
10 TABLET ORAL 2 TIMES DAILY
Qty: 60 TABLET | Refills: 0 | Status: SHIPPED | OUTPATIENT
Start: 2019-12-17 | End: 2019-12-23

## 2019-12-17 RX ORDER — DONEPEZIL HYDROCHLORIDE 10 MG/1
TABLET, FILM COATED ORAL
Qty: 30 TABLET | Refills: 5 | Status: SHIPPED | OUTPATIENT
Start: 2019-12-17 | End: 2020-01-08 | Stop reason: SDUPTHER

## 2019-12-23 RX ORDER — MEMANTINE HYDROCHLORIDE 10 MG/1
10 TABLET ORAL 2 TIMES DAILY
Qty: 60 TABLET | Refills: 0 | Status: SHIPPED | OUTPATIENT
Start: 2019-12-23 | End: 2020-01-08 | Stop reason: SDUPTHER

## 2019-12-30 ENCOUNTER — TELEPHONE (OUTPATIENT)
Dept: FAMILY MEDICINE CLINIC | Age: 84
End: 2019-12-30

## 2020-01-08 ENCOUNTER — OFFICE VISIT (OUTPATIENT)
Dept: FAMILY MEDICINE CLINIC | Age: 85
End: 2020-01-08
Payer: MEDICARE

## 2020-01-08 VITALS
HEART RATE: 62 BPM | OXYGEN SATURATION: 96 % | TEMPERATURE: 97.9 F | SYSTOLIC BLOOD PRESSURE: 110 MMHG | DIASTOLIC BLOOD PRESSURE: 70 MMHG

## 2020-01-08 VITALS
OXYGEN SATURATION: 96 % | HEART RATE: 62 BPM | DIASTOLIC BLOOD PRESSURE: 70 MMHG | BODY MASS INDEX: 23.23 KG/M2 | SYSTOLIC BLOOD PRESSURE: 110 MMHG | HEIGHT: 62 IN | TEMPERATURE: 97.9 F | RESPIRATION RATE: 16 BRPM

## 2020-01-08 DIAGNOSIS — G30.1 LATE ONSET ALZHEIMER'S DISEASE WITHOUT BEHAVIORAL DISTURBANCE (HCC): ICD-10-CM

## 2020-01-08 DIAGNOSIS — F02.80 LATE ONSET ALZHEIMER'S DISEASE WITHOUT BEHAVIORAL DISTURBANCE (HCC): ICD-10-CM

## 2020-01-08 DIAGNOSIS — J44.9 CHRONIC OBSTRUCTIVE PULMONARY DISEASE, UNSPECIFIED COPD TYPE (HCC): ICD-10-CM

## 2020-01-08 LAB
ALBUMIN SERPL-MCNC: 3.2 G/DL (ref 3.5–4.6)
ALP BLD-CCNC: 74 U/L (ref 40–130)
ALT SERPL-CCNC: 18 U/L (ref 0–33)
ANION GAP SERPL CALCULATED.3IONS-SCNC: 12 MEQ/L (ref 9–15)
AST SERPL-CCNC: 36 U/L (ref 0–35)
BASOPHILS ABSOLUTE: 0.1 K/UL (ref 0–0.2)
BASOPHILS RELATIVE PERCENT: 0.8 %
BILIRUB SERPL-MCNC: 0.3 MG/DL (ref 0.2–0.7)
BUN BLDV-MCNC: 20 MG/DL (ref 8–23)
CALCIUM SERPL-MCNC: 9 MG/DL (ref 8.5–9.9)
CHLORIDE BLD-SCNC: 101 MEQ/L (ref 95–107)
CO2: 23 MEQ/L (ref 20–31)
CREAT SERPL-MCNC: 0.98 MG/DL (ref 0.5–0.9)
EOSINOPHILS ABSOLUTE: 0.3 K/UL (ref 0–0.7)
EOSINOPHILS RELATIVE PERCENT: 4 %
GFR AFRICAN AMERICAN: >60
GFR NON-AFRICAN AMERICAN: 53.8
GLOBULIN: 3.3 G/DL (ref 2.3–3.5)
GLUCOSE BLD-MCNC: 112 MG/DL (ref 70–99)
HCT VFR BLD CALC: 44.4 % (ref 37–47)
HEMOGLOBIN: 14.6 G/DL (ref 12–16)
LYMPHOCYTES ABSOLUTE: 0.6 K/UL (ref 1–4.8)
LYMPHOCYTES RELATIVE PERCENT: 8.8 %
MCH RBC QN AUTO: 28.7 PG (ref 27–31.3)
MCHC RBC AUTO-ENTMCNC: 32.8 % (ref 33–37)
MCV RBC AUTO: 87.5 FL (ref 82–100)
MONOCYTES ABSOLUTE: 0.5 K/UL (ref 0.2–0.8)
MONOCYTES RELATIVE PERCENT: 7 %
NEUTROPHILS ABSOLUTE: 5.2 K/UL (ref 1.4–6.5)
NEUTROPHILS RELATIVE PERCENT: 79.4 %
PDW BLD-RTO: 15.4 % (ref 11.5–14.5)
PLATELET # BLD: 230 K/UL (ref 130–400)
POTASSIUM SERPL-SCNC: 5.1 MEQ/L (ref 3.4–4.9)
RBC # BLD: 5.07 M/UL (ref 4.2–5.4)
SODIUM BLD-SCNC: 136 MEQ/L (ref 135–144)
TOTAL PROTEIN: 6.5 G/DL (ref 6.3–8)
TSH SERPL DL<=0.05 MIU/L-ACNC: 2.75 UIU/ML (ref 0.44–3.86)
WBC # BLD: 6.6 K/UL (ref 4.8–10.8)

## 2020-01-08 PROCEDURE — 4040F PNEUMOC VAC/ADMIN/RCVD: CPT | Performed by: FAMILY MEDICINE

## 2020-01-08 PROCEDURE — G0438 PPPS, INITIAL VISIT: HCPCS | Performed by: NURSE PRACTITIONER

## 2020-01-08 PROCEDURE — G0008 ADMIN INFLUENZA VIRUS VAC: HCPCS | Performed by: NURSE PRACTITIONER

## 2020-01-08 PROCEDURE — 90653 IIV ADJUVANT VACCINE IM: CPT | Performed by: NURSE PRACTITIONER

## 2020-01-08 PROCEDURE — G8482 FLU IMMUNIZE ORDER/ADMIN: HCPCS | Performed by: FAMILY MEDICINE

## 2020-01-08 PROCEDURE — G8926 SPIRO NO PERF OR DOC: HCPCS | Performed by: FAMILY MEDICINE

## 2020-01-08 PROCEDURE — G8510 SCR DEP NEG, NO PLAN REQD: HCPCS | Performed by: FAMILY MEDICINE

## 2020-01-08 PROCEDURE — 1123F ACP DISCUSS/DSCN MKR DOCD: CPT | Performed by: NURSE PRACTITIONER

## 2020-01-08 PROCEDURE — G8420 CALC BMI NORM PARAMETERS: HCPCS | Performed by: FAMILY MEDICINE

## 2020-01-08 PROCEDURE — 3023F SPIROM DOC REV: CPT | Performed by: FAMILY MEDICINE

## 2020-01-08 PROCEDURE — G8482 FLU IMMUNIZE ORDER/ADMIN: HCPCS | Performed by: NURSE PRACTITIONER

## 2020-01-08 PROCEDURE — 1123F ACP DISCUSS/DSCN MKR DOCD: CPT | Performed by: FAMILY MEDICINE

## 2020-01-08 PROCEDURE — 4004F PT TOBACCO SCREEN RCVD TLK: CPT | Performed by: FAMILY MEDICINE

## 2020-01-08 PROCEDURE — 4040F PNEUMOC VAC/ADMIN/RCVD: CPT | Performed by: NURSE PRACTITIONER

## 2020-01-08 PROCEDURE — 1090F PRES/ABSN URINE INCON ASSESS: CPT | Performed by: FAMILY MEDICINE

## 2020-01-08 PROCEDURE — 99214 OFFICE O/P EST MOD 30 MIN: CPT | Performed by: FAMILY MEDICINE

## 2020-01-08 PROCEDURE — G8427 DOCREV CUR MEDS BY ELIG CLIN: HCPCS | Performed by: FAMILY MEDICINE

## 2020-01-08 RX ORDER — MEMANTINE HYDROCHLORIDE 10 MG/1
10 TABLET ORAL 2 TIMES DAILY
Qty: 60 TABLET | Refills: 5 | Status: SHIPPED | OUTPATIENT
Start: 2020-01-08

## 2020-01-08 RX ORDER — LOPERAMIDE HYDROCHLORIDE 2 MG/1
2 CAPSULE ORAL 4 TIMES DAILY PRN
Qty: 60 CAPSULE | Refills: 5 | Status: SHIPPED | OUTPATIENT
Start: 2020-01-08

## 2020-01-08 RX ORDER — MEMANTINE HYDROCHLORIDE 21 MG/1
21 CAPSULE, EXTENDED RELEASE ORAL DAILY
Status: CANCELLED | OUTPATIENT
Start: 2020-01-08

## 2020-01-08 RX ORDER — DONEPEZIL HYDROCHLORIDE 10 MG/1
TABLET, FILM COATED ORAL
Qty: 30 TABLET | Refills: 5 | Status: SHIPPED | OUTPATIENT
Start: 2020-01-08

## 2020-01-08 ASSESSMENT — ENCOUNTER SYMPTOMS
COUGH: 0
SHORTNESS OF BREATH: 0
GASTROINTESTINAL NEGATIVE: 1
RESPIRATORY NEGATIVE: 1
RHINORRHEA: 0
CHEST TIGHTNESS: 0
EYES NEGATIVE: 1

## 2020-01-08 ASSESSMENT — VISUAL ACUITY
OD_CC: 20/30
OS_CC: 20/30

## 2020-01-08 ASSESSMENT — PATIENT HEALTH QUESTIONNAIRE - PHQ9
SUM OF ALL RESPONSES TO PHQ9 QUESTIONS 1 & 2: 2
1. LITTLE INTEREST OR PLEASURE IN DOING THINGS: 1
SUM OF ALL RESPONSES TO PHQ QUESTIONS 1-9: 0
SUM OF ALL RESPONSES TO PHQ QUESTIONS 1-9: 2
2. FEELING DOWN, DEPRESSED OR HOPELESS: 1
SUM OF ALL RESPONSES TO PHQ QUESTIONS 1-9: 2
SUM OF ALL RESPONSES TO PHQ QUESTIONS 1-9: 0

## 2020-01-08 ASSESSMENT — LIFESTYLE VARIABLES: HOW OFTEN DO YOU HAVE A DRINK CONTAINING ALCOHOL: 0

## 2020-01-08 ASSESSMENT — COPD QUESTIONNAIRES: COPD: 1

## 2020-01-08 NOTE — PROGRESS NOTES
Medicare Annual Wellness Visit  Name: Wiley Hatchet Date: 2020   MRN: [id number] Sex: Female   Age: 80 y.o. Ethnicity: Non-/Non    : 10/6/1933 Race: Lety Gillespie is here for Medicare AWV    Screenings for behavioral, psychosocial and functional/safety risks, and cognitive dysfunction are all negative except as indicated below. These results, as well as other patient data from the [address] form, are documented in Flowsheets linked to this Encounter. Allergies   Allergen Reactions    Penicillins          Prior to Visit Medications    Medication Sig Taking?  Authorizing Provider   memantine ER (NAMENDA XR) 21 MG CP24 extended release capsule Take 21 mg by mouth daily Yes Historical Provider, MD   memantine (NAMENDA) 10 MG tablet Take 1 tablet by mouth 2 times daily  Natalya Arroyo MD   donepezil (ARICEPT) 10 MG tablet TAKE 1 TABLET BY MOUTH DAILY  Natalya Arroyo MD   loperamide (IMODIUM) 2 MG capsule Take 1 capsule by mouth 4 times daily as needed for Diarrhea  Natalya Arroyo MD         Past Medical History:   Diagnosis Date    [facility] obstructive [facility] disease) [facility] [address]     GI bleed        Past Surgical History:   Procedure Laterality Date    APPENDECTOMY      CARPAL TUNNEL RELEASE      ID INCIS/DRAIN THIGH/KNEE ABSCESS,DEEP Left 2017    EXCISION AND LAYERED PLASTIC CLOSURE OF [facility] LEFT ANTERIOR PICKENS performed by Mariam Wilder MD at [facility]      TONSILLECTOMY AND ADENOIDECTOMY           Family History   Problem Relation Age of Onset    Cancer Son         ESOPHAGEAL       CareTeam (Including outside providers/suppliers regularly involved in providing care):   Patient Care Team:  Natalya Arroyo MD as PCP - General (Family Medicine)  Natalya Arroyo MD as PCP - [facility]    Wt Readings from Last 3 Encounters:   18 127 lb (57.6 kg)   18 150 lb (68 kg)   17 126 lb (57.2 kg)     Vitals:    20 0946   BP: 110/70   Site: Left Upper Arm   Position: Sitting   Cuff Size: Medium Adult   Pulse: 62   Resp: 16   Temp: 97.9 °F (36.6 °C)   TempSrc: Oral   SpO2: 96%   Height: 5' 2\" (1.575 m)     Body mass index is 23.23 kg/m². Based upon direct observation of the patient, evaluation of cognition reveals global memory impairment noted. Patient has dementia. Son has POA and is present during appointment. Provides information for patient. Patient's complete Health Risk Assessment and screening values have been reviewed and are found in Flowsheets. The following problems were reviewed today and where indicated follow up appointments were made and/or referrals ordered. Positive Risk Factor Screenings with Interventions:     Fall Risk:  Timed Up and Go Test > 12 seconds? (Complete if either Fall Risk answers are Yes): no  2 or more falls in past year?: (!) yes  Fall with injury in past year?: no  Fall Risk Interventions:    · Patient declines any further evaluation/treatment for this issue    Substance Abuse:  Social History     Tobacco History     Smoking Status  Current Every Day Smoker    Smokeless Tobacco Use  Never Used          Alcohol History     Alcohol Use Status  No          Drug Use     Drug Use Status  No          Sexual Activity     Sexually Active  Not Asked               Audit Questionnaire: Screen for Alcohol Misuse  How often do you have a drink containing alcohol?: Never  Substance Abuse Interventions:  · Tobacco abuse:  patient is not ready to work toward tobacco cessation at this time    Health Habits/Nutrition:  Health Habits/Nutrition  Do you exercise for at least 20 minutes 2-3 times per week?: (!) No  Have you lost any weight without trying in the past 3 months?: No  Do you eat fewer than 2 meals per day?: No  Have you seen a dentist within the past year?: (!) No  Body mass index is 23.23 kg/m².   Health Habits/Nutrition Interventions:  · Inadequate physical activity:  patient is not ready to increase his/her physical activity level at this time  · Dental exam overdue:  patient declines dental evaluation    Hearing/Vision:   Visual Acuity Screening    Right eye Left eye Both eyes   Without correction:      With correction: 20/30 20/30 20/30     Hearing/Vision  Do you or your family notice any trouble with your hearing?: (!) Yes  Do you have difficulty driving, watching TV, or doing any of your daily activities because of your eyesight?: (!) Yes  Have you had an eye exam within the past year?: (!) No  Hearing/Vision Interventions:  · Hearing concerns:  patient declines any further evaluation/treatment for hearing issues  · Vision concerns:  patient declines any further evaluation/treatment for this issue    ADL:  ADLs  In the past 7 days, did you need help from others to perform any of the following everyday activities? Eating, dressing, grooming, bathing, toileting, or walking/balance?: (!) Dressing, Grooming, Bathing, Walking/Balance  In the past 7 days, did you need help from others to take care of any of the following?  Laundry, housekeeping, banking/finances, shopping, telephone use, food preparation, transportation, or taking medications?: (!) Laundry, Housekeeping, Banking/Finances, Shopping, Food Preparation, Transportation, Taking Medications  ADL Interventions:  · Patient declines any further evaluation/treatment for this issue  · Family has care set up for patient declines any concerns    Personalized Preventive Plan   Current Health Maintenance Status  Immunization History   Administered Date(s) Administered    Influenza 11/11/2013    Influenza Vaccine, unspecified formulation 11/11/2013, 10/30/2014, 12/09/2015    Influenza, High Dose (Fluzone 65 yrs and older) 12/09/2015, 09/21/2017    Influenza, Triv, inactivated, subunit, adjuvanted, IM (Fluad 65 yrs and older) 01/08/2020    Pneumococcal Polysaccharide ([id number]) 09/03/2014    Zoster Live (Zostavax) 08/25/2014        Health Maintenance   Topic Date Due    DTaP/Tdap/Td vaccine (1 - Tdap) 10[birth year]/1944    Shingles Vaccine (2 of 3) 10/20/2014    Annual Wellness Visit (AWV)  06/20/2019    Flu vaccine  Completed    Pneumococcal 65+ years Vaccine  Completed     Recommendations for Preventive Services Due: see orders and patient instructions/AVS.  . Recommended screening schedule for the next 5-10 years is provided to the patient in written form: see Patient Severa Andrea was seen today for medicare awv.     Diagnoses and all orders for this visit:    Routine general medical examination at a health care facility    Need for influenza vaccination  -     INFLUENZA, TRIV, INACTIVATED, SUBUNIT, ADJUVANTED, 65 YRS AND OLDER, IM, PREFILL SYR, 0.5ML (FLUAD TRIV)

## 2020-01-08 NOTE — PATIENT INSTRUCTIONS
Personalized Preventive Plan for Clinton Woodard - 1/8/2020  Medicare offers a range of preventive health benefits. Some of the tests and screenings are paid in full while other may be subject to a deductible, co-insurance, and/or copay. Some of these benefits include a comprehensive review of your medical history including lifestyle, illnesses that may run in your family, and various assessments and screenings as appropriate. After reviewing your medical record and screening and assessments performed today your provider may have ordered immunizations, labs, imaging, and/or referrals for you. A list of these orders (if applicable) as well as your Preventive Care list are included within your After Visit Summary for your review. Other Preventive Recommendations:    · A preventive eye exam performed by an eye specialist is recommended every 1-2 years to screen for glaucoma; cataracts, macular degeneration, and other eye disorders. · A preventive dental visit is recommended every 6 months. · Try to get at least 150 minutes of exercise per week or 10,000 steps per day on a pedometer . · Order or download the FREE \"Exercise & Physical Activity: Your Everyday Guide\" from [employer]. Call 1-984.406.2094 or search [employer] online. · You need 7980-5887 mg of calcium and 0150-6384 IU of vitamin D per day. It is possible to meet your calcium requirement with diet alone, but a vitamin D supplement is usually necessary to meet this goal.  · When exposed to the sun, use a sunscreen that protects against both UVA and UVB radiation with an SPF of 30 or greater. Reapply every 2 to 3 hours or after sweating, drying off with a towel, or swimming. · Always wear a seat belt when traveling in a car. Always wear a helmet when riding a bicycle or motorcycle.

## 2020-02-25 ENCOUNTER — APPOINTMENT (OUTPATIENT)
Dept: GENERAL RADIOLOGY | Age: 85
DRG: 177 | End: 2020-02-25
Payer: MEDICARE

## 2020-02-25 ENCOUNTER — HOSPITAL ENCOUNTER (INPATIENT)
Age: 85
LOS: 3 days | Discharge: NURSING, REHAB OR LONG-TERM CARE | DRG: 177 | End: 2020-02-28
Attending: STUDENT IN AN ORGANIZED HEALTH CARE EDUCATION/TRAINING PROGRAM | Admitting: INTERNAL MEDICINE
Payer: MEDICARE

## 2020-02-25 ENCOUNTER — APPOINTMENT (OUTPATIENT)
Dept: CT IMAGING | Age: 85
DRG: 177 | End: 2020-02-25
Payer: MEDICARE

## 2020-02-25 PROBLEM — J18.9 PNEUMONIA: Status: ACTIVE | Noted: 2020-02-25

## 2020-02-25 LAB
ALBUMIN SERPL-MCNC: 2.9 G/DL (ref 3.5–4.6)
ALP BLD-CCNC: 185 U/L (ref 40–130)
ALT SERPL-CCNC: 125 U/L (ref 0–33)
ANION GAP SERPL CALCULATED.3IONS-SCNC: 15 MEQ/L (ref 9–15)
APTT: 32.4 SEC (ref 24.4–36.8)
AST SERPL-CCNC: 268 U/L (ref 0–35)
BASE EXCESS VENOUS: 0 (ref -3–3)
BASOPHILS ABSOLUTE: 0.1 K/UL (ref 0–0.2)
BASOPHILS RELATIVE PERCENT: 0.4 %
BILIRUB SERPL-MCNC: 2.2 MG/DL (ref 0.2–0.7)
BUN BLDV-MCNC: 22 MG/DL (ref 8–23)
C-REACTIVE PROTEIN, HIGH SENSITIVITY: 15.9 MG/L (ref 0–5)
CALCIUM IONIZED: 0.85 MMOL/L (ref 1.12–1.32)
CALCIUM SERPL-MCNC: 8.6 MG/DL (ref 8.5–9.9)
CARBOXYHEMOGLOBIN: 4.3 % (ref 0–4)
CHLORIDE BLD-SCNC: 101 MEQ/L (ref 95–107)
CO2: 20 MEQ/L (ref 20–31)
CREAT SERPL-MCNC: 0.97 MG/DL (ref 0.5–0.9)
EOSINOPHILS ABSOLUTE: 0 K/UL (ref 0–0.7)
EOSINOPHILS RELATIVE PERCENT: 0.1 %
GFR AFRICAN AMERICAN: 47
GFR AFRICAN AMERICAN: >60
GFR NON-AFRICAN AMERICAN: 39
GFR NON-AFRICAN AMERICAN: 54.4
GLOBULIN: 4.1 G/DL (ref 2.3–3.5)
GLUCOSE BLD-MCNC: 133 MG/DL (ref 70–99)
GLUCOSE BLD-MCNC: 139 MG/DL (ref 60–115)
HCO3 VENOUS: 23.8 MMOL/L (ref 23–29)
HCT VFR BLD CALC: 48.6 % (ref 37–47)
HEMOGLOBIN: 15.6 G/DL (ref 12–16)
HEMOGLOBIN: 18.6 GM/DL (ref 12–16)
INFLUENZA A BY PCR: NEGATIVE
INFLUENZA B BY PCR: NEGATIVE
INR BLD: 1
LACTATE: 1.88 MMOL/L (ref 0.4–2)
LACTIC ACID: 2.1 MMOL/L (ref 0.5–2.2)
LIPASE: >600 U/L (ref 12–95)
LYMPHOCYTES ABSOLUTE: 0.1 K/UL (ref 1–4.8)
LYMPHOCYTES RELATIVE PERCENT: 0.9 %
MAGNESIUM: 1.9 MG/DL (ref 1.7–2.4)
MCH RBC QN AUTO: 28.1 PG (ref 27–31.3)
MCHC RBC AUTO-ENTMCNC: 32 % (ref 33–37)
MCV RBC AUTO: 87.6 FL (ref 82–100)
MONO TEST: NEGATIVE
MONOCYTES ABSOLUTE: 0.4 K/UL (ref 0.2–0.8)
MONOCYTES RELATIVE PERCENT: 2.8 %
NEUTROPHILS ABSOLUTE: 13 K/UL (ref 1.4–6.5)
NEUTROPHILS RELATIVE PERCENT: 95.8 %
O2 SAT, VEN: 57 %
PCO2, VEN: 32.7 MM HG (ref 40–50)
PDW BLD-RTO: 15.2 % (ref 11.5–14.5)
PERFORMED ON: ABNORMAL
PH VENOUS: 7.47 (ref 7.35–7.45)
PLATELET # BLD: 203 K/UL (ref 130–400)
PO2, VEN: 27 MM HG
POC CHLORIDE: 107 MEQ/L (ref 99–110)
POC CREATININE WHOLE BLOOD: 1.2
POC CREATININE: 1.3 MG/DL (ref 0.6–1.2)
POC HEMATOCRIT: 55 % (ref 36–48)
POC POTASSIUM: 4.1 MEQ/L (ref 3.5–5.1)
POC SAMPLE TYPE: ABNORMAL
POC SODIUM: 139 MEQ/L (ref 136–145)
POTASSIUM SERPL-SCNC: 4.2 MEQ/L (ref 3.4–4.9)
PRO-BNP: 3194 PG/ML
PROTHROMBIN TIME: 13.6 SEC (ref 12.3–14.9)
RBC # BLD: 5.55 M/UL (ref 4.2–5.4)
SODIUM BLD-SCNC: 136 MEQ/L (ref 135–144)
STREP GRP A PCR: NEGATIVE
TCO2 CALC VENOUS: 25 MMOL/L
TOTAL CK: 24 U/L (ref 0–170)
TOTAL PROTEIN: 7 G/DL (ref 6.3–8)
TROPONIN: <0.01 NG/ML (ref 0–0.01)
TSH SERPL DL<=0.05 MIU/L-ACNC: 1.68 UIU/ML (ref 0.44–3.86)
WBC # BLD: 13.5 K/UL (ref 4.8–10.8)

## 2020-02-25 PROCEDURE — 1210000000 HC MED SURG R&B

## 2020-02-25 PROCEDURE — 71045 X-RAY EXAM CHEST 1 VIEW: CPT

## 2020-02-25 PROCEDURE — 74177 CT ABD & PELVIS W/CONTRAST: CPT

## 2020-02-25 PROCEDURE — 85025 COMPLETE CBC W/AUTO DIFF WBC: CPT

## 2020-02-25 PROCEDURE — 2580000003 HC RX 258: Performed by: PHYSICIAN ASSISTANT

## 2020-02-25 PROCEDURE — 96366 THER/PROPH/DIAG IV INF ADDON: CPT

## 2020-02-25 PROCEDURE — 36415 COLL VENOUS BLD VENIPUNCTURE: CPT

## 2020-02-25 PROCEDURE — 85730 THROMBOPLASTIN TIME PARTIAL: CPT

## 2020-02-25 PROCEDURE — 86308 HETEROPHILE ANTIBODY SCREEN: CPT

## 2020-02-25 PROCEDURE — 86141 C-REACTIVE PROTEIN HS: CPT

## 2020-02-25 PROCEDURE — 83880 ASSAY OF NATRIURETIC PEPTIDE: CPT

## 2020-02-25 PROCEDURE — 6360000004 HC RX CONTRAST MEDICATION: Performed by: STUDENT IN AN ORGANIZED HEALTH CARE EDUCATION/TRAINING PROGRAM

## 2020-02-25 PROCEDURE — 82803 BLOOD GASES ANY COMBINATION: CPT

## 2020-02-25 PROCEDURE — 82565 ASSAY OF CREATININE: CPT

## 2020-02-25 PROCEDURE — 82550 ASSAY OF CK (CPK): CPT

## 2020-02-25 PROCEDURE — 82375 ASSAY CARBOXYHB QUANT: CPT

## 2020-02-25 PROCEDURE — 83735 ASSAY OF MAGNESIUM: CPT

## 2020-02-25 PROCEDURE — 84132 ASSAY OF SERUM POTASSIUM: CPT

## 2020-02-25 PROCEDURE — 6360000002 HC RX W HCPCS: Performed by: STUDENT IN AN ORGANIZED HEALTH CARE EDUCATION/TRAINING PROGRAM

## 2020-02-25 PROCEDURE — 85610 PROTHROMBIN TIME: CPT

## 2020-02-25 PROCEDURE — 6370000000 HC RX 637 (ALT 250 FOR IP): Performed by: STUDENT IN AN ORGANIZED HEALTH CARE EDUCATION/TRAINING PROGRAM

## 2020-02-25 PROCEDURE — 87502 INFLUENZA DNA AMP PROBE: CPT

## 2020-02-25 PROCEDURE — 82330 ASSAY OF CALCIUM: CPT

## 2020-02-25 PROCEDURE — 2580000003 HC RX 258: Performed by: STUDENT IN AN ORGANIZED HEALTH CARE EDUCATION/TRAINING PROGRAM

## 2020-02-25 PROCEDURE — 99285 EMERGENCY DEPT VISIT HI MDM: CPT

## 2020-02-25 PROCEDURE — 84484 ASSAY OF TROPONIN QUANT: CPT

## 2020-02-25 PROCEDURE — 84295 ASSAY OF SERUM SODIUM: CPT

## 2020-02-25 PROCEDURE — 84443 ASSAY THYROID STIM HORMONE: CPT

## 2020-02-25 PROCEDURE — 82435 ASSAY OF BLOOD CHLORIDE: CPT

## 2020-02-25 PROCEDURE — 96365 THER/PROPH/DIAG IV INF INIT: CPT

## 2020-02-25 PROCEDURE — 36600 WITHDRAWAL OF ARTERIAL BLOOD: CPT

## 2020-02-25 PROCEDURE — 83605 ASSAY OF LACTIC ACID: CPT

## 2020-02-25 PROCEDURE — 87651 STREP A DNA AMP PROBE: CPT

## 2020-02-25 PROCEDURE — 6360000002 HC RX W HCPCS: Performed by: PHYSICIAN ASSISTANT

## 2020-02-25 PROCEDURE — 83690 ASSAY OF LIPASE: CPT

## 2020-02-25 PROCEDURE — 85014 HEMATOCRIT: CPT

## 2020-02-25 PROCEDURE — 80053 COMPREHEN METABOLIC PANEL: CPT

## 2020-02-25 PROCEDURE — 87040 BLOOD CULTURE FOR BACTERIA: CPT

## 2020-02-25 RX ORDER — IPRATROPIUM BROMIDE AND ALBUTEROL SULFATE 2.5; .5 MG/3ML; MG/3ML
1 SOLUTION RESPIRATORY (INHALATION)
Status: DISCONTINUED | OUTPATIENT
Start: 2020-02-26 | End: 2020-02-26

## 2020-02-25 RX ORDER — ONDANSETRON 2 MG/ML
4 INJECTION INTRAMUSCULAR; INTRAVENOUS EVERY 6 HOURS PRN
Status: DISCONTINUED | OUTPATIENT
Start: 2020-02-25 | End: 2020-02-28 | Stop reason: HOSPADM

## 2020-02-25 RX ORDER — SODIUM CHLORIDE 0.9 % (FLUSH) 0.9 %
10 SYRINGE (ML) INJECTION EVERY 12 HOURS SCHEDULED
Status: DISCONTINUED | OUTPATIENT
Start: 2020-02-25 | End: 2020-02-28 | Stop reason: HOSPADM

## 2020-02-25 RX ORDER — NICOTINE 21 MG/24HR
1 PATCH, TRANSDERMAL 24 HOURS TRANSDERMAL DAILY
Status: DISCONTINUED | OUTPATIENT
Start: 2020-02-26 | End: 2020-02-28 | Stop reason: HOSPADM

## 2020-02-25 RX ORDER — POLYETHYLENE GLYCOL 3350 17 G/17G
17 POWDER, FOR SOLUTION ORAL DAILY PRN
Status: DISCONTINUED | OUTPATIENT
Start: 2020-02-25 | End: 2020-02-28 | Stop reason: HOSPADM

## 2020-02-25 RX ORDER — SODIUM CHLORIDE 0.9 % (FLUSH) 0.9 %
10 SYRINGE (ML) INJECTION PRN
Status: DISCONTINUED | OUTPATIENT
Start: 2020-02-25 | End: 2020-02-28 | Stop reason: HOSPADM

## 2020-02-25 RX ORDER — 0.9 % SODIUM CHLORIDE 0.9 %
1000 INTRAVENOUS SOLUTION INTRAVENOUS ONCE
Status: COMPLETED | OUTPATIENT
Start: 2020-02-25 | End: 2020-02-25

## 2020-02-25 RX ORDER — PROMETHAZINE HYDROCHLORIDE 12.5 MG/1
12.5 TABLET ORAL EVERY 6 HOURS PRN
Status: DISCONTINUED | OUTPATIENT
Start: 2020-02-25 | End: 2020-02-28 | Stop reason: HOSPADM

## 2020-02-25 RX ORDER — OSELTAMIVIR PHOSPHATE 75 MG/1
75 CAPSULE ORAL ONCE
Status: COMPLETED | OUTPATIENT
Start: 2020-02-25 | End: 2020-02-25

## 2020-02-25 RX ORDER — SODIUM CHLORIDE 9 MG/ML
INJECTION, SOLUTION INTRAVENOUS CONTINUOUS
Status: DISCONTINUED | OUTPATIENT
Start: 2020-02-25 | End: 2020-02-28

## 2020-02-25 RX ORDER — ACETAMINOPHEN 650 MG/1
650 SUPPOSITORY RECTAL EVERY 6 HOURS PRN
Status: DISCONTINUED | OUTPATIENT
Start: 2020-02-25 | End: 2020-02-28 | Stop reason: HOSPADM

## 2020-02-25 RX ORDER — LEVOFLOXACIN 5 MG/ML
750 INJECTION, SOLUTION INTRAVENOUS ONCE
Status: COMPLETED | OUTPATIENT
Start: 2020-02-25 | End: 2020-02-25

## 2020-02-25 RX ADMIN — OSELTAMIVIR PHOSPHATE 75 MG: 75 CAPSULE ORAL at 19:20

## 2020-02-25 RX ADMIN — IOPAMIDOL 100 ML: 755 INJECTION, SOLUTION INTRAVENOUS at 22:02

## 2020-02-25 RX ADMIN — LEVOFLOXACIN 750 MG: 5 INJECTION, SOLUTION INTRAVENOUS at 19:21

## 2020-02-25 RX ADMIN — SODIUM CHLORIDE 1000 ML: 9 INJECTION, SOLUTION INTRAVENOUS at 19:20

## 2020-02-25 RX ADMIN — MEROPENEM 1 G: 1 INJECTION, POWDER, FOR SOLUTION INTRAVENOUS at 22:44

## 2020-02-25 RX ADMIN — SODIUM CHLORIDE: 9 INJECTION, SOLUTION INTRAVENOUS at 22:44

## 2020-02-25 RX ADMIN — Medication 10 ML: at 22:47

## 2020-02-25 ASSESSMENT — ENCOUNTER SYMPTOMS
NAUSEA: 1
VOMITING: 1
COUGH: 1
DIARRHEA: 1

## 2020-02-25 NOTE — ED NOTES
Afshan Gail is pt son, he would like to be called once we have testing back on her [id number], RN  02/25/20 4444

## 2020-02-25 NOTE — ED TRIAGE NOTES
Pt from home. Pt has home health aids that come in 2xday. Pt started vomiting today. Pt was soiled in urine, emesis and feces. Pt was cleaned up. Pt a&ox1 which is chago baseline per son.

## 2020-02-26 LAB
ALBUMIN SERPL-MCNC: 2.3 G/DL (ref 3.5–4.6)
ALP BLD-CCNC: 133 U/L (ref 40–130)
ALT SERPL-CCNC: 93 U/L (ref 0–33)
AMYLASE: 227 U/L (ref 22–93)
ANION GAP SERPL CALCULATED.3IONS-SCNC: 11 MEQ/L (ref 9–15)
AST SERPL-CCNC: 142 U/L (ref 0–35)
BASOPHILS ABSOLUTE: 0 K/UL (ref 0–0.2)
BASOPHILS RELATIVE PERCENT: 0.5 %
BILIRUB SERPL-MCNC: 1.6 MG/DL (ref 0.2–0.7)
BILIRUBIN DIRECT: 1 MG/DL (ref 0–0.4)
BILIRUBIN, INDIRECT: 0.6 MG/DL (ref 0–0.6)
BUN BLDV-MCNC: 22 MG/DL (ref 8–23)
CALCIUM SERPL-MCNC: 8.1 MG/DL (ref 8.5–9.9)
CHLORIDE BLD-SCNC: 111 MEQ/L (ref 95–107)
CO2: 21 MEQ/L (ref 20–31)
CREAT SERPL-MCNC: 0.94 MG/DL (ref 0.5–0.9)
EOSINOPHILS ABSOLUTE: 0 K/UL (ref 0–0.7)
EOSINOPHILS RELATIVE PERCENT: 0.2 %
GFR AFRICAN AMERICAN: 51
GFR AFRICAN AMERICAN: >60
GFR NON-AFRICAN AMERICAN: 43
GFR NON-AFRICAN AMERICAN: 56.4
GLUCOSE BLD-MCNC: 95 MG/DL (ref 70–99)
HCT VFR BLD CALC: 40 % (ref 37–47)
HEMOGLOBIN: 13 G/DL (ref 12–16)
LACTIC ACID: 0.8 MMOL/L (ref 0.5–2.2)
LIPASE: 287 U/L (ref 12–95)
LYMPHOCYTES ABSOLUTE: 0.2 K/UL (ref 1–4.8)
LYMPHOCYTES RELATIVE PERCENT: 2.4 %
MCH RBC QN AUTO: 28.2 PG (ref 27–31.3)
MCHC RBC AUTO-ENTMCNC: 32.5 % (ref 33–37)
MCV RBC AUTO: 86.6 FL (ref 82–100)
MONOCYTES ABSOLUTE: 0.7 K/UL (ref 0.2–0.8)
MONOCYTES RELATIVE PERCENT: 8.1 %
NEUTROPHILS ABSOLUTE: 8 K/UL (ref 1.4–6.5)
NEUTROPHILS RELATIVE PERCENT: 88.8 %
PDW BLD-RTO: 14.8 % (ref 11.5–14.5)
PERFORMED ON: ABNORMAL
PLATELET # BLD: 165 K/UL (ref 130–400)
POC CREATININE: 1.2 MG/DL (ref 0.6–1.2)
POC SAMPLE TYPE: ABNORMAL
POTASSIUM REFLEX MAGNESIUM: 4.4 MEQ/L (ref 3.4–4.9)
RBC # BLD: 4.62 M/UL (ref 4.2–5.4)
SODIUM BLD-SCNC: 143 MEQ/L (ref 135–144)
TOTAL PROTEIN: 5.7 G/DL (ref 6.3–8)
WBC # BLD: 9 K/UL (ref 4.8–10.8)

## 2020-02-26 PROCEDURE — 97162 PT EVAL MOD COMPLEX 30 MIN: CPT

## 2020-02-26 PROCEDURE — 97166 OT EVAL MOD COMPLEX 45 MIN: CPT

## 2020-02-26 PROCEDURE — 82150 ASSAY OF AMYLASE: CPT

## 2020-02-26 PROCEDURE — 83690 ASSAY OF LIPASE: CPT

## 2020-02-26 PROCEDURE — 83605 ASSAY OF LACTIC ACID: CPT

## 2020-02-26 PROCEDURE — 6360000002 HC RX W HCPCS: Performed by: PHYSICIAN ASSISTANT

## 2020-02-26 PROCEDURE — 80076 HEPATIC FUNCTION PANEL: CPT

## 2020-02-26 PROCEDURE — 80048 BASIC METABOLIC PNL TOTAL CA: CPT

## 2020-02-26 PROCEDURE — 2580000003 HC RX 258: Performed by: INTERNAL MEDICINE

## 2020-02-26 PROCEDURE — 36415 COLL VENOUS BLD VENIPUNCTURE: CPT

## 2020-02-26 PROCEDURE — 1210000000 HC MED SURG R&B

## 2020-02-26 PROCEDURE — 6370000000 HC RX 637 (ALT 250 FOR IP): Performed by: PHYSICIAN ASSISTANT

## 2020-02-26 PROCEDURE — 2580000003 HC RX 258: Performed by: PHYSICIAN ASSISTANT

## 2020-02-26 PROCEDURE — 85025 COMPLETE CBC W/AUTO DIFF WBC: CPT

## 2020-02-26 PROCEDURE — 92610 EVALUATE SWALLOWING FUNCTION: CPT

## 2020-02-26 RX ORDER — IPRATROPIUM BROMIDE AND ALBUTEROL SULFATE 2.5; .5 MG/3ML; MG/3ML
1 SOLUTION RESPIRATORY (INHALATION) EVERY 4 HOURS PRN
Status: DISCONTINUED | OUTPATIENT
Start: 2020-02-26 | End: 2020-02-28 | Stop reason: HOSPADM

## 2020-02-26 RX ADMIN — MEROPENEM 1 G: 1 INJECTION, POWDER, FOR SOLUTION INTRAVENOUS at 10:14

## 2020-02-26 RX ADMIN — Medication 10 ML: at 23:25

## 2020-02-26 RX ADMIN — SODIUM CHLORIDE: 9 INJECTION, SOLUTION INTRAVENOUS at 23:26

## 2020-02-26 RX ADMIN — MEROPENEM 1 G: 1 INJECTION, POWDER, FOR SOLUTION INTRAVENOUS at 23:24

## 2020-02-26 NOTE — ED PROVIDER NOTES
Attends meetings of clubs or organizations: None     Relationship status: None    Intimate partner violence:     Fear of current or ex partner: None     Emotionally abused: None     Physically abused: None     Forced sexual activity: None   Other Topics Concern    None   Social History Narrative    None       SCREENINGS      @FLOW([id number])@      PHYSICAL EXAM    (up to 7 for level 4, 8 or more for level 5)     ED Triage Vitals   BP Temp Temp Source Pulse Resp SpO2 Height Weight   02/25/20 1810 02/25/20 1818 02/25/20 1818 02/25/20 1810 02/25/20 1810 02/25/20 1810 02/25/20 1810 02/25/20 1810   (!) 151/77 100.2 °F (37.9 °C) Oral 106 20 92 % 5' (1.524 m) 93 lb (42.2 kg)       Physical Exam  Vitals signs and nursing note reviewed. Constitutional:       General: She is awake. She is in acute distress. Appearance: Normal appearance. She is well-developed and underweight. She is ill-appearing and toxic-appearing. She is not diaphoretic. Comments: No photophobia. No phonophobia. Unkempt. HENT:      Head: Normocephalic and atraumatic. No Hopson's sign. Right Ear: External ear normal.      Left Ear: External ear normal.      Nose: Nose normal. No congestion or rhinorrhea. Mouth/Throat:      Mouth: Mucous membranes are dry. Pharynx: Oropharynx is clear. No oropharyngeal exudate or posterior oropharyngeal erythema. Eyes:      General: No scleral icterus. Right eye: No foreign body or discharge. Left eye: No discharge. Extraocular Movements: Extraocular movements intact. Conjunctiva/sclera: Conjunctivae normal.      Left eye: No exudate. Pupils: Pupils are equal, round, and reactive to light. Neck:      Musculoskeletal: Normal range of motion and neck supple. No neck rigidity. Vascular: No JVD. Trachea: No tracheal deviation. Comments: No meningismus. Cardiovascular:      Rate and Rhythm: Regular rhythm. Tachycardia present.       Heart sounds: Normal heart sounds. Heart sounds not distant. No murmur. No friction rub. No gallop. Pulmonary:      Effort: Pulmonary effort is normal. No respiratory distress. Breath sounds: No stridor. Examination of the right-middle field reveals rhonchi. Examination of the left-middle field reveals rhonchi. Examination of the right-lower field reveals rhonchi. Examination of the left-lower field reveals rhonchi. Rhonchi present. No wheezing or rales. Chest:      Chest wall: No tenderness. Abdominal:      General: Abdomen is flat. Bowel sounds are normal. There is no distension or abdominal bruit. There are no signs of injury. Palpations: Abdomen is soft. There is no shifting dullness, fluid wave, hepatomegaly, splenomegaly, mass or pulsatile mass. Tenderness: There is no abdominal tenderness. There is no right CVA tenderness, left CVA tenderness, guarding or rebound. Negative signs include García's sign, Rovsing's sign and McBurney's sign. Hernia: No hernia is present. Musculoskeletal: Normal range of motion. General: No swelling, tenderness, deformity or signs of injury. Lymphadenopathy:      Head:      Right side of head: No submental adenopathy. Left side of head: No submental adenopathy. Skin:     General: Skin is warm and dry. Capillary Refill: Capillary refill takes less than 2 seconds. Coloration: Skin is not jaundiced or pale. Findings: No bruising, erythema, lesion or rash. Neurological:      General: No focal deficit present. Mental Status: She is alert and oriented to person, place, and time. Mental status is at baseline. Cranial Nerves: No cranial nerve deficit. Sensory: No sensory deficit. Motor: No weakness. Coordination: Coordination normal.      Deep Tendon Reflexes: Reflexes are normal and symmetric. Psychiatric:         Mood and Affect: Mood normal.         Behavior: Behavior normal. Behavior is cooperative. Thought Content: Thought content normal.         Judgment: Judgment normal.         DIAGNOSTIC RESULTS     EKG: All EKG's are interpreted by the Emergency Department Physician who either signs or Co-signsthis chart in the absence of a cardiologist.        RADIOLOGY:   [place] such as CT, Ultrasound and MRI are read by the radiologist. Florian Aniahuong radiographic images are visualized and preliminarily interpreted by the emergency physician with the below findings:    Chest x-ray: Right middle lobe and left lingular and possibly left lower lobe infiltrates superimposed on chronic lower lobe lung changes possible fibrosis. No pneumothorax. No free air. Possible left lower lobe pleural effusion.     Interpretation per the Radiologist below, if available at the time ofthis note:    XR CHEST PORTABLE    (Results Pending)   CT ABDOMEN PELVIS W IV CONTRAST Additional Contrast? None    (Results Pending)         ED BEDSIDE ULTRASOUND:   Performed by ED Physician - none    LABS:  Labs Reviewed   CBC WITH AUTO DIFFERENTIAL - Abnormal; Notable for the following components:       Result Value    WBC 13.5 (*)     RBC 5.55 (*)     Hematocrit 48.6 (*)     MCHC 32.0 (*)     RDW 15.2 (*)     Neutrophils Absolute 13.0 (*)     Lymphocytes Absolute 0.1 (*)     All other components within normal limits   COMPREHENSIVE METABOLIC PANEL - Abnormal; Notable for the following components:    Glucose 133 (*)     CREATININE 0.97 (*)     GFR Non- 54.4 (*)     Alb 2.9 (*)     Total Bilirubin 2.2 (*)     Alkaline Phosphatase 185 (*)      (*)      (*)     Globulin 4.1 (*)     All other components within normal limits   HIGH SENSITIVITY CRP - Abnormal; Notable for the following components:    CRP High Sensitivity 15.9 (*)     All other components within normal limits   LIPASE - Abnormal; Notable for the following components:    Lipase >600 (*)     All other components within normal limits   CARBOXYHEMOGLOBIN - lipase is greater than 600. Patient does live along with a home health care nurse that comes and checks on her. The ER physician is suspicious that the patient may be a closet drinker. She also smokes. I have spoken with the patient for greater than 3 minutes about smoking cessation. I have advised the cold turkey method for quitting. I discussed the risks of smoking such as infection, blood clots, poor healing, cancer, heart attack, strokes, and neuropathy. ED attending gave report to Dr. Shad García who asked that the CT scan of the abdomen ordered in the ER. The ED attending informed Dr. Shad García that he had just done this moments earlier. He will follow-up on the CAT scan report. CRITICAL CARE TIME   Total Critical Care time was 38 minutes, excluding separately reportableprocedures. There was a high probability of clinicallysignificant/life threatening deterioration in the patient's condition which required my urgent intervention. CONSULTS:  None    PROCEDURES:  Unless otherwise noted below, none     Procedures    FINAL IMPRESSION      1. Pneumonia of both lower lobes due to infectious organism ([facility] [address]    2. Febrile illness, acute    3. Hypoxia    4. Idiopathic acute pancreatitis without infection or necrosis    5. Dehydration    6. Tobacco dependence          DISPOSITION/PLAN   DISPOSITION Decision To Admit 02/25/2020 08:14:57 PM      PATIENT REFERRED TO:  No follow-up provider specified.     DISCHARGE MEDICATIONS:  New Prescriptions    No medications on file          (Please note that portions of this note were completed with a voice recognition program.  Efforts were made to edit the dictations but occasionally words are mis-transcribed.)    Hayden Brwon DO (electronically signed)  Attending Emergency Physician          Hayden Brown DO  02/25/20 2032

## 2020-02-26 NOTE — PROGRESS NOTES
[facility]   Facility/Department: [facility] MED SURG UNIT  Speech Language Pathology  Clinical Bedside Swallow Evaluation    NAME:Lola Sanders  : 10/6/1933 (80 y.o.)   MRN: [id number]  ROOM: [place] [address]  ADMISSION DATE: 2020  PATIENT DIAGNOSIS(ES): Pneumonia [J18.9]  Chief Complaint   Patient presents with    Emesis     N&V SINCE THIS MORNING     Patient Active Problem List    Diagnosis Date Noted    Pneumonia 2020    Late onset Alzheimer's disease without behavioral disturbance ([address] 2020    Carcinoma of skin 2017    Stool incontinence 2014    Tobacco abuse 2014    Lactase deficiency 02/10/2014    Ataxia 2013    Tremor 2013    History of GI bleed 2013    Chronic bronchitis ([address] 2013    Alzheimer's dementia ([facility] [address] 2012    B12 deficiency 2012    Chronic diarrhea 2012    COPD (chronic obstructive pulmonary disease) ([address]      Past Medical History:   Diagnosis Date    COPD (chronic obstructive pulmonary disease) ([address]     GI bleed      Past Surgical History:   Procedure Laterality Date    APPENDECTOMY      CARPAL TUNNEL RELEASE      IA INCIS/DRAIN THIGH/KNEE ABSCESS,DEEP Left 2017    EXCISION AND LAYERED PLASTIC CLOSURE OF BCC LEFT ANTERIOR PICKENS performed by Lewis Don MD at [address] [place] [address]       Allergies   Allergen Reactions    Penicillins        DATE ONSET: 2020    Date of Evaluation: 2020   Evaluating Therapist: Mary Ramirez, GILBERT    Recommended Diet and Intervention                                                                 When cleared by GI:   Diet Solids Recommendation: Dysphagia Minced and Moist (Dysphagia II)  Liquid Consistency Recommendation:  Thin  Recommended Form of Meds: PO     Therapeutic Interventions: Diet tolerance monitoring, Patient/Family education, Therapeutic PO trials with SLP    Compensatory Swallowing Strategies  Compensatory Swallowing Strategies: Small bites/sips;Upright as possible for all oral intake    Reason for Referral  Mary Lou Garvin was referred for a bedside swallow evaluation to assess the efficiency of her swallow function, identify signs and symptoms of aspiration and make recommendations regarding safe dietary consistencies, effective compensatory strategies, and safe eating environment. General  Chart Reviewed: Yes  Behavior/Cognition: Alert;Confused; Requires cueing  Respiratory Status: Room air  Communication Observation: Functional  Follows Directions: Simple  Dentition: Dentures top;Dentures bottom  Patient Positioning: Partially reclined;Upright in bed  Baseline Vocal Quality: Normal  Prior Dysphagia History: Per nursing report, GI is on consult and rec: NPO until tomorrow, however approved for SLP to complete bedside swallow evaluation this date. Consistencies Administered: Reg solid; Dysphagia Pureed (Dysphagia I); Thin - cup    Current Diet level:  Current Diet : NPO  Current Liquid Diet : NPO    Oral Motor Deficits  Oral/Motor  Oral Motor: Within functional limits    Oral Phase Dysfunction  Oral Phase  Oral Phase: Exceptions  Oral Phase Dysfunction  Decreased Anterior to Posterior Transit: Reg solid  Lingual/Palatal Residue: Reg solid  Oral Phase  Oral Phase - Comment: Mild oral dysphagia characterized by impaired bolus formation and propulsion with solid, with lingual pumping with attempts to clear residue. Indicators of Pharyngeal Phase Dysfunction   Pharyngeal Phase  Pharyngeal Phase: WFL  Pharyngeal Phase   Pharyngeal: Adequate laryngeal elevation. No overt s/s of aspiration. Burping noted x 1. Impression  Dysphagia Diagnosis: Mild oral stage dysphagia  Dysphagia Outcome Severity Scale: Level 5: Mild dysphagia- Distant supervision.  May need one diet consistency restricted     Treatment Plan  Requires SLP Intervention: Yes  Duration/Frequency of Treatment: 1-2x/week during LOS or until goals met          Treatment/Goals  Short-term Goals  Timeframe for Short-term Goals: 1 week  Goal 1: Pt will tolerate minced and moist diet with thin liquids with no overt s/s of aspiration. Goal 2: Pt will tolerate therapeutic po trials of soft/regular with no overt s/s of difficulty or aspiration. Long-term Goals  Timeframe for Long-term Goals: 1 week  Goal 1: Pt will tolerate least restrictive diet with no overt s/s of aspiration. Prognosis  Prognosis  Prognosis for safe diet advancement: fair  Barriers to reach goals: cognitive deficits  Individuals consulted  Consulted and agree with results and recommendations: RN(Amber JONES)    Education  Patient Education: No education provided at this time secondary to decreased cognition. Patient Education Response: No evidence of learning  Safety Devices in place: Yes  Type of devices: Bed alarm in place;Call light within reach([employer])    Pain:  Pain Assessment  Patient Currently in Pain: Denies       Therapy Time  SLP Individual Minutes  Time In: 1420  Time Out: 1039 [facility]  Minutes: 15              Signature: Electronically signed by Ruddy Cano.  GILBERT Mo on 2/26/2020 at 2:49 PM

## 2020-02-26 NOTE — PROGRESS NOTES
Department of Internal Medicine  Progress Note      SUBJECTIVE:   No acute events overnight.        ROS:  All 12 systems reviewed and negative except mentioned in HPI and Assessment and plan    MEDICATIONS:  Current Facility-Administered Medications   Medication Dose Route Frequency Provider Last Rate Last Dose    ipratropium-albuterol (DUONEB) nebulizer solution 1 ampule  1 ampule Inhalation Q4H PRN Tomás Brooks MD        sodium chloride flush 0.9 % injection 10 mL  10 mL Intravenous 2 times per day LISE Reed   10 mL at 02/25/20 2247    sodium chloride flush 0.9 % injection 10 mL  10 mL Intravenous PRN Js Pate        acetaminophen (TYLENOL) suppository 650 mg  650 mg Rectal Q6H PRN LISE Pate        polyethylene glycol (GLYCOLAX) packet 17 g  17 g Oral Daily PRN LISE Pate        promethazine (PHENERGAN) tablet 12.5 mg  12.5 mg Oral Q6H PRN LISE Pate        [facility] injection 4 mg  4 mg Intravenous Q6H PRN LISE Pate        enoxaparin (LOVENOX) injection 40 mg  40 mg Subcutaneous Daily Js Haynes        0.9 % sodium chloride infusion   Intravenous Continuous LISE Reed 100 mL/hr at 02/25/20 2244      nicotine (NICODERM CQ) 21 MG/24HR 1 patch  1 patch Transdermal Daily Js Reed   1 patch at 02/26/20 1014    meropenem (MERREM) 1 g in sodium chloride 0.9 % 100 mL IVPB (mini-bag)  1 g Intravenous Q12H LISE Reed   Stopped at 02/26/20 1044         OBJECTIVE:  Vital Signs:  Vitals:    02/26/20 0837   BP: (!) 106/50   Pulse: 67   Resp:    Temp: 98.6 °F (37 °C)   SpO2:        Focal exam:      General Exam (except as mentioned above):  CONSTITUTIONAL: Awake, alert, no apparent distress  EYES:  PERRL, conjunctiva normal  ENT:  Normocephalic, atraumatic  NECK:  Supple  BACK:  Symmetric  LUNGS:  CTAB except bilateral basilar crackles. CARDIOVASCULAR:  S1S2 present  ABDOMEN:  soft, non-distended, non-tender  MUSCULOSKELETAL:  There is no redness, warmth, or swelling of the joints. NEUROLOGIC:  Alert, awake, oriented x 3. No FND  EXTREMITIES: Warm and well perfused.      LABS  Recent Labs     02/25/20 1909 02/25/20 1910 02/26/20  0628   WBC  --  13.5* 9.0   RBC  --  5.55* 4.62   HGB 18.6* 15.6 13.0   HCT  --  48.6* 40.0   MCV  --  87.6 86.6   MCH  --  28.1 28.2   MCHC  --  32.0* 32.5*   RDW  --  15.2* 14.8*   PLT  --  203 165       Recent Labs     02/25/20 1910 02/25/20 2033 02/26/20 0628     --  143   K 4.2  --  4.4     --  111*   CO2 20  --  21   BUN 22  --  22   CREATININE 0.97* 1.2 0.94*   GLUCOSE 133*  --  95   CALCIUM 8.6  --  8.1*       Recent Labs     02/25/20 1910   MG 1.9           ASSESSMENT AND PLAN    Active Hospital Problems    Diagnosis Date Noted    Pneumonia [J18.9] 02/25/2020         DVT prophylaxis: Lovenox  Jordyn Paulson MD  Pager : [phone]

## 2020-02-26 NOTE — PROGRESS NOTES
Nutrition Assessment    Type and Reason for Visit: Initial, Positive Nutrition Screen(wt loss/wounds)    Nutrition Recommendations: If able to start po, recommend General Diet with consistency as per speech recommendations. ONS to start based on outcome of swalling evaluation    Nutrition Assessment: Pt admitted with PNA, severe dementia and confusion noted. No family present to obtain nutrition/wt history. Pt is currently NPO and awaiting swallowing evaluation. Will monitor nutrition progression and provide appropriate ONS once po starts    Malnutrition Assessment:  · Malnutrition Status: Insufficient data  · Context: Chronic illness  · Findings of the 6 clinical characteristics of malnutrition (Minimum of 2 out of 6 clinical characteristics is required to make the diagnosis of moderate or severe Protein Calorie Malnutrition based on AND/ASPEN Guidelines):  1. Energy Intake-Unable to assess, Unable to assess    2. Weight Loss-Unable to assess,    3. Fat Loss-Unable to assess,    4. Muscle Loss-Unable to assess,    5. Fluid Accumulation-Unable to assess,    6.  Strength-Not measured    Nutrition Risk Level: High    Nutrient Needs:  · Estimated Daily Total Kcal: 3582-1272 (kg x 28-30)  · Estimated Daily Protein (g): 67-78 g (kg x 1.2-1.4)  · Estimated Daily Total Fluid (ml/day): ~1600 ml (1 ml/kcal)    Nutrition Diagnosis:   · Problem: Increased nutrient needs  · Etiology: related to Increased demand for energy/nutrients     Signs and symptoms:  as evidenced by Presence of wounds    Objective Information:  · Nutrition-Focused Physical Findings: peripheral line. IVF: NS @ 100 ml/hr. 1+ BLE edema noted. PMH includes severe dementia, chronic diarrhea, COPD. Confusion noted. Pt currently sleeping with AVASYS in place. No weight hx per EMR. No family to obtain nutrition/wt hx. · Wound Type: Stage I, Pressure Ulcer(kaye heels.   redness/rash to buttocks)  · Current Nutrition Therapies:  · Oral Diet

## 2020-02-26 NOTE — PROGRESS NOTES
Attempted to check if patient was incontinent, she became very agitated. She told me \"I don't want to be bothered right now\". Will try again later.

## 2020-02-26 NOTE — PLAN OF CARE
Nutrition Problem: Increased nutrient needs  Intervention: Food and/or Nutrient Delivery: (po diet as per speech recommendations.   ONS to start based on outcome of swalling evaluation)  Nutritional Goals: ability to start po diet vs need for nutrition support

## 2020-02-26 NOTE — PROGRESS NOTES
Alone  Type of Home: Mobile home  Home Layout: One level  Home Access: Stairs to enter with rails  Entrance Stairs - Number of Steps: 3  Entrance Stairs - Rails: Right  Bathroom Shower/Tub: Tub/Shower unit  Bathroom Equipment: (NA)  Home Equipment: [employer]  ADL Assistance: Independent  Homemaking Assistance: (has aides do cleaning)  Ambulation Assistance: Independent(a cane when her OA gets bad)  Transfer Assistance: Independent    OBJECTIVE:   Hearing: Exceptions to [facility]  Hearing Exceptions: Hard of hearing/hearing concerns    Cognition:  Orientation Level: Oriented to place, Oriented to person(Unsure of date and situation)  Follows Commands: Within Functional Limits    Observation/Palpation  Observation: No acute distress noted. ROM:  RLE PROM: WFL  LLE PROM: WFL    Strength:  Strength RLE  Strength RLE: WFL  Strength LLE  Strength LLE: WFL    Neuro:  Balance  Sitting - Static: Good  Sitting - Dynamic: Fair  Standing - Static: Fair  Standing - Dynamic: Poor     Motor Control  Gross Motor? : (impulsive; decreased safety awareness)  Sensation  Overall Sensation Status: WFL    Bed mobility  Supine to Sit: Supervision  Sit to Supine: Supervision  Scooting: Supervision  Comment: Verbal cues provided    Transfers  Sit to Stand: Stand by assistance  Stand to sit: Stand by assistance  Bed to Chair: Contact guard assistance    Ambulation 1  Surface: level tile  Device: No Device;Hand-Held Assist  Assistance: Minimal assistance;Contact guard assistance  Quality of Gait: Attempting to ambulate without AD, however unsafe. Pt reaching out for furniture. Signficant bracing on therapist's hands for support. Distance: 5ft ant/retro    Stairs/Curb  Stairs?: No         Activity Tolerance  Activity Tolerance: Patient Tolerated treatment well          PT Education  PT Education: Goals;PT Role;Plan of Care    ASSESSMENT:   Body structures, Functions, Activity limitations: Decreased functional mobility ; Decreased balance;Decreased safe awareness;Decreased cognition;Decreased coordination  Decision Making: Medium Complexity  History: High  Exam: Med  Clinical Presentation: Med    Prognosis: Good  Barriers to Learning: memory/cognition    DISCHARGE RECOMMENDATIONS:  Discharge Recommendations: Continue to assess pending progress, Patient would benefit from continued therapy after discharge    Assessment: Continued PT indicated to progress mobility and facilitate DC at highest level of indep and safety. Concerns for pt returning home at [address] [facility]. Significant falls risk with further concerns for pt's cognitive deficits. Rec 24hr supervision and support. REQUIRES PT FOLLOW UP: Yes      PLAN OF CARE:  Plan  Times per week: 3-6  Current Treatment Recommendations: Strengthening, Balance Training, Functional Mobility Training, Transfer Training, ADL/Self-care Training, Endurance Training, Neuromuscular Re-education, Equipment Evaluation, Education, & procurement, Home Exercise Program, Safety Education & Training, Patient/Caregiver Education & Training, Stair training, Gait Training  Safety Devices  Type of devices: All fall risk precautions in place    Goals:  Short term goals  Short term goal 1: Pt to complete HEP with indep  Long term goals  Long term goal 1: Pt to complete all bed mobility with indep  Long term goal 2: Pt to complete all transfers with indep  Long term goal 3: Pt to ambulate 50ft with LRD and supervision  Long term goal 4: Pt to manage 3 steps with HR and supervision    [facility] (6 CLICK) Milena Hidalgo 28 Inpatient Mobility Raw Score : 20     Therapy Time:   Individual   Time In 026 848 14 90   Time Out 1500   Minutes [address] [place] [address], 02/26/20 at 4:04 PM         Definitions for assistance levels  Independent = pt does not require any physical supervision or assistance from another person for activity completion. Device may be needed.   Stand by assistance = pt requires verbal cues or instructions from another person, close to but not touching, to perform the activity  Minimal assistance= pt performs 75% or more of the activity; assistance is required to complete the activity  Moderate assistance= pt performs 50% of the activity; assistance is required to complete the activity  Maximal assistance = pt performs 25% of the activity; assistance is required to complete the activity  Dependent = pt requires total physical assistance to accomplish the task

## 2020-02-26 NOTE — H&P
Hospital Medicine History & Physical      PCP: Nola Mckeon MD    Date of Admission: 2/25/2020    Date of Service: 2/25/20      Chief Complaint:  Nausea and vomiting      History Of Present Illness:  80 y.o. female who presented to [facility] ED for complaints of nausea and vomiting which started this morning. The patient lives alone and has a home health nurse who takes care of her due to severe dementia. The patient was found today covered in vomit and feces. She has a known history of chronic diarrhea. In the ED she was found to have a harsh cough with a temp of 102 degrees F. She has a productive cough. She is a poor historian. Past Medical History:          Diagnosis Date    COPD [facility]chronic obstructive pulmonary disease) [facility] [address]     GI bleed        Past Surgical History:          Procedure Laterality Date    APPENDECTOMY      CARPAL TUNNEL RELEASE      [place] INCIS/DRAIN THIGH/KNEE ABSCESS,DEEP Left 11/20/2017    EXCISION AND LAYERED PLASTIC CLOSURE OF BCC LEFT ANTERIOR PICKENS performed by Thad Richardson MD at [address] [place] [address]         Medications Prior to Admission:      Prior to Admission medications    Medication Sig Start Date End Date Taking? Authorizing Provider   memantine (NAMENDA) 10 MG tablet Take 1 tablet by mouth 2 times daily 1/8/20   Nola Mckeon MD   donepezil (ARICEPT) 10 MG tablet TAKE 1 TABLET BY MOUTH DAILY 1/8/20   Nola Mckeon MD   loperamide (IMODIUM) 2 MG capsule Take 1 capsule by mouth 4 times daily as needed for Diarrhea 1/8/20   Nola Mckeon MD   memantine ER (NAMENDA XR) 21 MG CP24 extended release capsule Take 21 mg by mouth daily    Historical Provider, MD       Allergies:  Penicillins    Social History:      The patient currently lives home    TOBACCO:   reports that she has been smoking. She has been smoking about 0.50 packs per day.  She has never used smokeless tobacco.  ETOH:   reports no history of alcohol use.      Family History:       Positive as follows:        Problem Relation Age of Onset    Cancer Son         ESOPHAGEAL       REVIEW OF SYSTEMS:   Pertinent positives as noted in the HPI. All other systems reviewed and negative. PHYSICAL EXAM:    BP (!) 146/72   Pulse 107   Temp 102 °F (38.9 °C) (Oral)   Resp 18   Ht 5' (1.524 m)   Wt 93 lb (42.2 kg)   SpO2 90%   BMI 18.16 kg/m²     General appearance:  No apparent distress, appears stated age and cooperative. HEENT:  Normal cephalic, atraumatic without obvious deformity. Pupils equal, round, and reactive to light. Extra ocular muscles intact. Conjunctivae/corneas clear. Neck: Supple, with full range of motion. No jugular venous distention. Trachea midline. Respiratory:  labored respiratory effort. rhonchi  Cardiovascular:  Rapid rate and rhythm with normal S1/S2 without murmurs, rubs or gallops. Abdomen: Soft, tender, non-distended with normal bowel sounds. Musculoskeletal:  No clubbing, cyanosis or edema bilaterally. Full range of motion without deformity. Skin: Skin color, texture, turgor normal.  No rashes or lesions. Neurologic:  Neurovascularly intact without any focal sensory/motor deficits.  Cranial nerves: II-XII intact, grossly non-focal.  Psychiatric:  Alert and oriented, thought content appropriate, normal insight  Capillary Refill: Brisk,< 3 seconds   Peripheral Pulses: +2 palpable, equal bilaterally       Labs:     Recent Labs     02/25/20 1909 02/25/20 1910   WBC  --  13.5*   HGB 18.6* 15.6   HCT  --  48.6*   PLT  --  203     Recent Labs     02/25/20 1909 02/25/20 1910   NA  --  136   K  --  4.2   CL  --  101   CO2  --  20   BUN  --  22   CREATININE 1.3* 0.97*   CALCIUM  --  8.6     Recent Labs     02/25/20 1910   *   *   BILITOT 2.2*   ALKPHOS 185*     Recent Labs     02/25/20 1909   INR 1.0     Recent Labs     02/25/20 1910   CKTOTAL 24   TROPONINI <0.010       Urinalysis:    No results found for: NITRU,

## 2020-02-26 NOTE — PROGRESS NOTES
Patient allowed us to change her brief this morning, she was incontinent of urine. Zinc cream applied d/t redness. Patient has no complaints of pain or nausea.

## 2020-02-26 NOTE — PROGRESS NOTES
Patient refusing to be straight cath for urine sample, I will try later again today.      Electronically signed by Brennan Vasquez RN on 2/26/2020 at 1:43 PM

## 2020-02-26 NOTE — PROGRESS NOTES
[facility] OCCUPATIONAL THERAPY EVALUATION - ACUTE     NAME: Kiara Scruggs  : 10/6/1933 (80 y.o.)  MRN: [id number]  CODE STATUS: DNR-CC  Room: H928/H858-43    Date of Service: 2020    Patient Diagnosis(es): Pneumonia [J18.9]   Chief Complaint   Patient presents with    Emesis     N&V SINCE THIS MORNING     Patient Active Problem List    Diagnosis Date Noted    Pneumonia 2020    Late onset Alzheimer's disease without behavioral disturbance ([facility] [address] 2020    Carcinoma of skin 2017    Stool incontinence 2014    Tobacco abuse 2014    Lactase deficiency 02/10/2014    Ataxia 2013    Tremor 2013    History of GI bleed 2013    Chronic bronchitis ([facility] [address] 2013    Alzheimer's dementia ([facility] [address] 2012    B12 deficiency 2012    Chronic diarrhea 2012    COPD (chronic obstructive pulmonary disease) ([facility] [address]         Past Medical History:   Diagnosis Date    COPD (chronic obstructive pulmonary disease) ([facility] [address]     GI bleed      Past Surgical History:   Procedure Laterality Date    APPENDECTOMY      CARPAL TUNNEL RELEASE      [place] INCIS/DRAIN THIGH/KNEE ABSCESS,DEEP Left 2017    EXCISION AND LAYERED PLASTIC CLOSURE OF BCC LEFT ANTERIOR PICKENS performed by Rosmery Roman MD at [address] [place] [address]        Safety Devices: Safety Devices  Safety Devices in place: Yes  Type of devices: All fall risk precautions in place    Subjective:  PT seen alone. \"I'm cold cover me up. \"       Pain Reassessment: 0/10          Prior Level of Function:  Social/Functional History  Lives With: Alone  Type of Home: Mobile home  Home Layout: One level  Home Access: Stairs to enter with rails  Entrance Stairs - Number of Steps: 3  Entrance Stairs - Rails: Right  Bathroom Shower/Tub: Tub/Shower unit  Bathroom Equipment: (NA)  Home Equipment: [employer]  ADL Assistance: Independent  Homemaking Assistance: (Per report that patient has assist for cleaning)  Ambulation Assistance: Independent  Transfer Assistance: Independent  Additional Comments: Pt reports cane usage when OA flares up    OBJECTIVE:     Orientation Status:  Orientation  Overall Orientation Status: Impaired  Orientation Level: Oriented to place, Oriented to person    Observation:  Observation/Palpation  Posture: Fair  Observation: forward flexion noted    Cognition Status:  Cognition  Cognition Comment: Decreased STM, Decreased problem solving    Perception Status:  Perception  Overall Perceptual Status: WFL    Sensation Status:  Sensation  Overall Sensation Status: WFL    Vision and Hearing Status:  Vision  Vision: Within Functional Limits  Hearing  Hearing: Exceptions to [facility]  Hearing Exceptions: Hard of hearing/hearing concerns     ROM:   LUE AROM (degrees)  LUE AROM : WFL  Left Hand AROM (degrees)  Left Hand AROM: WFL  RUE AROM (degrees)  RUE AROM : WFL  Right Hand AROM (degrees)  Right Hand AROM: WFL    Strength:  LUE Strength  Gross LUE Strength: WFL  L Hand General: 4/5  RUE Strength  Gross RUE Strength: WFL  R Hand General: 4/5    Coordination, Tone, Quality of Movement: Tone RUE  RUE Tone: Normotonic  Tone LUE  LUE Tone: Normotonic  Coordination  Movements Are Fluid And Coordinated: No  Coordination and Movement description: Decreased speed, Left UE, Right UE    Hand Dominance:  Hand Dominance  Hand Dominance: Right    ADL Status:  ADL  Feeding: Unable to assess(comment)  Grooming: Minimal assistance  UE Bathing: Minimal assistance  LE Bathing: Moderate assistance  UE Dressing: Minimal assistance  LE Dressing:  Moderate assistance  Toileting: Unable to assess(comment)          Therapy key for assistance levels -   Independent = Pt. is able to perform task with no assistance but may require a device   Stand by assistance = Pt. does not perform task at an independent level but does not need physical assistance, requires verbal cues  Minimal, Moderate, Maximal Assistance = Pt. requires physical assistance (25%, 50%, 75% assist from helper) for task but is able to actively participate in task   Dependent = Pt. requires total assistance with task and is not able to actively participate with task completion     Functional Mobility:CGA/Min A          Bed Mobility  Bed mobility  Supine to Sit: Supervision  Sit to Supine: Supervision  Scooting: Supervision    Seated and Standing Balance:  Balance  Sitting Balance: Supervision  Standing Balance: Minimal assistance    Functional Endurance:  Activity Tolerance  Activity Tolerance: Patient limited by fatigue  Activity Tolerance: poor+    D/C Recommendations:  OT D/C RECOMMENDATIONS  REQUIRES OT FOLLOW UP: Yes    Equipment Recommendations:       OT Education:        OT Follow Up:  OT D/C RECOMMENDATIONS  REQUIRES OT FOLLOW UP: Yes       Assessment/Discharge Disposition:     Performance deficits / Impairments: Decreased functional mobility , Decreased endurance, Decreased balance, Decreased high-level IADLs, Decreased cognition, Decreased strength, Decreased ADL status  Prognosis: Fair  Discharge Recommendations: Continue to assess pending progress  Decision Making: Medium Complexity  History: 2 complexities  Exam: 7 deficits  Assistance / Modification:  Mod A    Six Click Score    How much help for putting on and taking off regular lower body clothing?: A Lot  How much help for Bathing?: A Lot  How much help for Toileting?: A Lot  How much help for putting on and taking off regular upper body clothing?: A Little  How much help for taking care of personal grooming?: A Little  How much help for eating meals?: None  AM-[facility] Inpatient Daily Activity Raw Score: 16  AM-PAC Inpatient ADL T-Scale Score : 35.96  ADL Inpatient CMS 0-100% Score: 53.32    Plan:  Plan  Times per week: 1-4x/wk  Current Treatment Recommendations: Strengthening, Balance Training, Endurance Training, Self-Care / ADL, Functional Mobility Training, Equipment Evaluation, Education, & procurement, Patient/Caregiver Education & Training, Neuromuscular Re-education, Cognitive/Perceptual Training    Goals:   Patient will:    - Improve functional endurance to tolerate/complete 10-15 mins of ADL's  - Be Supervision in UB ADLs   - Be SBA in LB ADLs  - Be Supervision in ADL transfers without LOB  - Be Supervision in toileting tasks  - Improve bilateral UE strength and endurance to Fair+ in order to participate in self-care activities as projected. - Access appropriate D/C site with as few architectural barriers as possible. - Sequence self-care tasks with out verbal cues. Patient Goal: Patient goals :  To return to home      Discussed and agreed upon: Yes Comments:     Therapy Time:   OT Individual Minutes  Time In: 6579  Time Out: 1500  Minutes: 15    Electronically signed by:    Naoma Goltz, OTR/L  2/66/4038, 3:48 PM Electronically signed by Naoma Goltz, OTR/L on 2/63/11 at 3:49 PM

## 2020-02-26 NOTE — CARE COORDINATION
[facility] Case Management Initial Discharge Assessment    Met with son Lizz Villalobos & daughter-in-law Blue Rich to discuss discharge plan. (pt has dementia)       PCP: Suze Martinez MD                                Date of Last Visit: 1/8/20    If no PCP, list provided? N/A    Discharge Planning    Living Arrangements: at home dependent on family care    Who do you live with? Alone    Who helps you with your care:  family or private caregiver    If lives at home:     Do you have any barriers navigating in your home? yes - Dementia    Patient can perform ADL? No    Current Services (outpatient and in home) :  Private Hire Help/Aides (Company No company-private)    Dialysis: No    Is transportation available to get to your appointments? Yes    DME Equipment:  yes - walker, cane, rollator    Respiratory equipment: None    Respiratory provider:  no     Pharmacy:  yes - Drug [address] with Medication Assistance Program?  No      Patient agreeable to [facility]? N/A    Patient agreeable to SNF/Rehab? Yes, Company TBD    Other discharge needs identified? Other Screening by [facility] for nursing home Medicaid    Freedom of choice list provided with basic dialogue that supports the patient's individualized plan of care/goals and shares the quality data associated with the providers. Yes    Does Patient Have a High-Risk for Readmission Diagnosis (CHF, PN, MI, COPD)? Yes, see care coordinator assessment    The plan for Transition of Care is related to the following treatment goals:PT/OT evals and SNF placement    Initial Discharge Plan? (Note: please see concurrent daily documentation for any updates after initial note). Pt has dementia and lives alone. Son Lizz Villalobos provides much care and assistance and states when he is not there they have private duty aides. These aides come about 3 hours in the morning and again in the evening or just 3 hours in the evening if the son is there.  Although pt has DME, they state she will not use it. Family also reports that Domenic Henry [facility] has been involved with the case. They feel pt should not reside in the home anymore and would like DC to SNF. Gave family SNF list for freedom of choice.  They would also like to meet with Ashleigh Cruz in [facility] to apply for nursing home Medicaid    The Patient and/or patient representative: patient was provided with choice of any post-acute providers for care and equipment and agrees with discharge plan  Yes    Electronically signed by MILAGROS Gabriel on 2/26/2020 at 11:55 AM

## 2020-02-26 NOTE — PROGRESS NOTES
[facility]   Facility/Department: [facility] SURG UNIT  Speech Language Pathology    NAME:Lola Sanders  : 10/6/1933  ROOM: [place] [address]    A swallow screen order was entered for this patient, however Speech Therapy cannot complete unless a Clinical Bedside Swallow Evaluation is ordered. Please enter order in Epic if needed.      Thank you,    Aashish Kerr CCC-SLP, Date: 2020, Time: 8:33 AM

## 2020-02-26 NOTE — PROGRESS NOTES
[facility] Respiratory Therapy Evaluation   Current Order:  DUONEB Q4 W/A     Home Regimen: NONE      Ordering Physician: Lamont Everett PAC  Re-evaluation Date:  2/29     Diagnosis: PNEUMONIA      Patient Status: Stable / Unstable + Physician notified    The following MDI Criteria must be met in order to convert aerosol to MDI with spacer. If unable to meet, MDI will be converted to aerosol:  []  Patient able to demonstrate the ability to use MDI effectively  []  Patient alert and cooperative  []  Patient able to take deep breath with 5-10 second hold  []  Medication(s) available in this delivery method   []  Peak flow greater than or equal to 200 ml/min            Current Order Substituted To  (same drug, same frequency)   Aerosol to MDI [] Albuterol Sulfate 0.083% unit dose by aerosol Albuterol Sulfate MDI 2 puffs by inhalation with spacer    [] Levalbuterol 1.25 mg unit dose by aerosol Levalbuterol MDI 2 puffs by inhalation with spacer    [] Levalbuterol 0.63 mg unit dose by aerosol Levalbuterol MDI 2 puffs by inhalation with spacer    [] Ipratropium Bromide 0.02% unit dose by aerosol Ipratropium Bromide MDI 2 puffs by inhalation with spacer    [] Duoneb (Ipratropium + Albuterol) unit dose by aerosol Ipratropium MDI + Albuterol MDI 2 puffs by inhalation w/spacer   MDI to Aerosol [] Albuterol Sulfate MDI Albuterol Sulfate 0.083% unit dose by aerosol    [] Levalbuterol MDI 2 puffs by inhalation Levalbuterol 1.25 mg unit dose by aerosol    [] Ipratropium Bromide MDI by inhalation Ipratropium Bromide 0.02% unit dose by aerosol    [] Combivent (Ipratropium + Albuterol) MDI by inhalation Duoneb (Ipratropium + Albuterol) unit dose by aerosol   Treatment Assessment [Frequency/Schedule]:  Change frequency to: _________DUONEB Q4 PRN_______________________________________per Protocol, P&T, MEC      Points 0 1 2 3 4   Pulmonary Status  Non-Smoker  []   Smoking history   < 20 pack years  []   Smoking history  ?  20 pack

## 2020-02-26 NOTE — PROGRESS NOTES
Patient has dementia, is confused but seems to reorient easily. Rupali Villaseñor her daughter was able to help me with admission questions. Patient is not showing any signs of nausea or pain. Brief is on due to incontinence, q2 turn. AVASYS is in room to ensure safety. Patient has been sleeping well, respirations are unlabored. Patient is ordered to be NPO, sign is door.

## 2020-02-27 LAB
ALBUMIN SERPL-MCNC: 2.4 G/DL (ref 3.5–4.6)
ALP BLD-CCNC: 130 U/L (ref 40–130)
ALT SERPL-CCNC: 62 U/L (ref 0–33)
AMYLASE: 78 U/L (ref 22–93)
ANION GAP SERPL CALCULATED.3IONS-SCNC: 13 MEQ/L (ref 9–15)
AST SERPL-CCNC: 62 U/L (ref 0–35)
BASOPHILS ABSOLUTE: 0 K/UL (ref 0–0.2)
BASOPHILS RELATIVE PERCENT: 0.5 %
BILIRUB SERPL-MCNC: 0.8 MG/DL (ref 0.2–0.7)
BILIRUBIN DIRECT: 0.5 MG/DL (ref 0–0.4)
BILIRUBIN, INDIRECT: 0.3 MG/DL (ref 0–0.6)
BUN BLDV-MCNC: 21 MG/DL (ref 8–23)
CALCIUM SERPL-MCNC: 8.4 MG/DL (ref 8.5–9.9)
CHLORIDE BLD-SCNC: 113 MEQ/L (ref 95–107)
CO2: 19 MEQ/L (ref 20–31)
CREAT SERPL-MCNC: 1.04 MG/DL (ref 0.5–0.9)
EOSINOPHILS ABSOLUTE: 0.1 K/UL (ref 0–0.7)
EOSINOPHILS RELATIVE PERCENT: 1.3 %
GFR AFRICAN AMERICAN: >60
GFR NON-AFRICAN AMERICAN: 50.2
GLUCOSE BLD-MCNC: 64 MG/DL (ref 70–99)
HCT VFR BLD CALC: 39.3 % (ref 37–47)
HEMOGLOBIN: 12.8 G/DL (ref 12–16)
LACTIC ACID: 1 MMOL/L (ref 0.5–2.2)
LIPASE: 41 U/L (ref 12–95)
LYMPHOCYTES ABSOLUTE: 0.4 K/UL (ref 1–4.8)
LYMPHOCYTES RELATIVE PERCENT: 5.3 %
MCH RBC QN AUTO: 28.7 PG (ref 27–31.3)
MCHC RBC AUTO-ENTMCNC: 32.5 % (ref 33–37)
MCV RBC AUTO: 88.3 FL (ref 82–100)
MONOCYTES ABSOLUTE: 0.5 K/UL (ref 0.2–0.8)
MONOCYTES RELATIVE PERCENT: 6.2 %
NEUTROPHILS ABSOLUTE: 6.3 K/UL (ref 1.4–6.5)
NEUTROPHILS RELATIVE PERCENT: 86.7 %
PDW BLD-RTO: 15.3 % (ref 11.5–14.5)
PLATELET # BLD: 176 K/UL (ref 130–400)
POTASSIUM REFLEX MAGNESIUM: 4.5 MEQ/L (ref 3.4–4.9)
RBC # BLD: 4.45 M/UL (ref 4.2–5.4)
SODIUM BLD-SCNC: 145 MEQ/L (ref 135–144)
TOTAL PROTEIN: 5.6 G/DL (ref 6.3–8)
WBC # BLD: 7.3 K/UL (ref 4.8–10.8)

## 2020-02-27 PROCEDURE — 85025 COMPLETE CBC W/AUTO DIFF WBC: CPT

## 2020-02-27 PROCEDURE — 82150 ASSAY OF AMYLASE: CPT

## 2020-02-27 PROCEDURE — 83690 ASSAY OF LIPASE: CPT

## 2020-02-27 PROCEDURE — 80076 HEPATIC FUNCTION PANEL: CPT

## 2020-02-27 PROCEDURE — 2580000003 HC RX 258: Performed by: PHYSICIAN ASSISTANT

## 2020-02-27 PROCEDURE — 6370000000 HC RX 637 (ALT 250 FOR IP): Performed by: INTERNAL MEDICINE

## 2020-02-27 PROCEDURE — 99222 1ST HOSP IP/OBS MODERATE 55: CPT | Performed by: INTERNAL MEDICINE

## 2020-02-27 PROCEDURE — 97116 GAIT TRAINING THERAPY: CPT

## 2020-02-27 PROCEDURE — 83605 ASSAY OF LACTIC ACID: CPT

## 2020-02-27 PROCEDURE — 1210000000 HC MED SURG R&B

## 2020-02-27 PROCEDURE — 80048 BASIC METABOLIC PNL TOTAL CA: CPT

## 2020-02-27 PROCEDURE — 6360000002 HC RX W HCPCS: Performed by: PHYSICIAN ASSISTANT

## 2020-02-27 PROCEDURE — 6370000000 HC RX 637 (ALT 250 FOR IP): Performed by: PHYSICIAN ASSISTANT

## 2020-02-27 PROCEDURE — 36415 COLL VENOUS BLD VENIPUNCTURE: CPT

## 2020-02-27 PROCEDURE — 97535 SELF CARE MNGMENT TRAINING: CPT

## 2020-02-27 PROCEDURE — 2580000003 HC RX 258: Performed by: INTERNAL MEDICINE

## 2020-02-27 RX ORDER — LOPERAMIDE HYDROCHLORIDE 2 MG/1
2 CAPSULE ORAL 4 TIMES DAILY PRN
Status: DISCONTINUED | OUTPATIENT
Start: 2020-02-27 | End: 2020-02-28 | Stop reason: HOSPADM

## 2020-02-27 RX ORDER — DONEPEZIL HYDROCHLORIDE 10 MG/1
10 TABLET, FILM COATED ORAL DAILY
Status: DISCONTINUED | OUTPATIENT
Start: 2020-02-27 | End: 2020-02-28 | Stop reason: HOSPADM

## 2020-02-27 RX ORDER — MEMANTINE HYDROCHLORIDE 10 MG/1
10 TABLET ORAL 2 TIMES DAILY
Status: DISCONTINUED | OUTPATIENT
Start: 2020-02-27 | End: 2020-02-28 | Stop reason: HOSPADM

## 2020-02-27 RX ADMIN — MEROPENEM 1 G: 1 INJECTION, POWDER, FOR SOLUTION INTRAVENOUS at 11:16

## 2020-02-27 RX ADMIN — ENOXAPARIN SODIUM 40 MG: 40 INJECTION SUBCUTANEOUS at 11:16

## 2020-02-27 RX ADMIN — MEROPENEM 1 G: 1 INJECTION, POWDER, FOR SOLUTION INTRAVENOUS at 23:02

## 2020-02-27 RX ADMIN — MEMANTINE HYDROCHLORIDE 10 MG: 10 TABLET ORAL at 17:00

## 2020-02-27 RX ADMIN — MEMANTINE HYDROCHLORIDE 10 MG: 10 TABLET ORAL at 20:33

## 2020-02-27 RX ADMIN — SODIUM CHLORIDE: 9 INJECTION, SOLUTION INTRAVENOUS at 11:30

## 2020-02-27 RX ADMIN — DONEPEZIL HYDROCHLORIDE 10 MG: 10 TABLET, FILM COATED ORAL at 17:01

## 2020-02-27 ASSESSMENT — PAIN SCALES - GENERAL: PAINLEVEL_OUTOF10: 0

## 2020-02-27 NOTE — PROGRESS NOTES
Physical Therapy Med Surg Daily Treatment Note  Facility/Department: [facility]  Room: [place] [address]       NAME: Raven Ma  : 10/6/1933 (80 y.o.)  MRN: [id number]  CODE STATUS: DNR-CC    Date of Service: 2020    Patient Diagnosis(es): Pneumonia [J18.9]   Chief Complaint   Patient presents with    Emesis     N&V SINCE THIS MORNING     Patient Active Problem List    Diagnosis Date Noted    Pneumonia 2020    Late onset Alzheimer's disease without behavioral disturbance ([facility] [address] 2020    Carcinoma of skin 2017    Stool incontinence 2014    Tobacco abuse 2014    Lactase deficiency 02/10/2014    Ataxia 2013    Tremor 2013    History of GI bleed 2013    Chronic bronchitis ([facility] [address] 2013    Alzheimer's dementia ([facility] [address] 2012    B12 deficiency 2012    Chronic diarrhea 2012    COPD (chronic obstructive pulmonary disease) ([facility] [address]         Past Medical History:   Diagnosis Date    COPD (chronic obstructive pulmonary disease) ([facility] [address]     GI bleed      Past Surgical History:   Procedure Laterality Date    APPENDECTOMY      CARPAL TUNNEL RELEASE      [place] INCIS/DRAIN THIGH/KNEE ABSCESS,DEEP Left 2017    EXCISION AND LAYERED PLASTIC CLOSURE OF BCC LEFT ANTERIOR PICKENS performed by Shaun Morgan MD at [address] [place] [address]       SUBJECTIVE   General  Chart Reviewed: Yes  Family / Caregiver Present: No  Subjective  Subjective: Patient sleeping upon arrival, able to awake- patient agreeable to tx.      Pre-Session Pain Report     Pain Screening  Patient Currently in Pain: Denies     Post-Session Pain Report  Denies     OBJECTIVE        Bed mobility  Supine to Sit: Supervision;Stand by assistance  Sit to Supine: Supervision;Stand by assistance  Scooting: Supervision;Stand by assistance    Transfers  Sit to Stand: Stand by assistance  Stand to sit: Stand by assistance    Ambulation 1  Surface: level tile  Device: Rolling Walker  Assistance: Contact guard assistance;Minimal assistance  Quality of Gait: attempted ambulation with ww this date however patient demonstrates poor safety awareness- unable to correct with cuing, patient unable to avoid  hitting several obstacles, demonstrates decreased awareness of medical lines. Distance: 15'x2         ASSESSMENT   Body structures, Functions, Activity limitations: Decreased functional mobility ; Decreased balance;Decreased safe awareness;Decreased cognition;Decreased coordination  Assessment: session limited by  patient's motivation and cognitive status. Patient demonstrates poor safety awareness with all functional mobility. Attempted ambulation with ww due to poor balance however patient unable to use correctly despite cuing. Prognosis: Good  Barriers to Learning: memory/cognition  REQUIRES PT FOLLOW UP: Yes     Discharge Recommendations:  Continue to assess pending progress, Patient would benefit from continued therapy after discharge    Goals  Short term goals  Short term goal 1: Pt to complete HEP with indep  Long term goals  Long term goal 1: Pt to complete all bed mobility with indep  Long term goal 2: Pt to complete all transfers with indep  Long term goal 3: Pt to ambulate 50ft with LRD and supervision  Long term goal 4: Pt to manage 3 steps with HR and supervision    PLAN    Plan  Times per week: 3-6  Current Treatment Recommendations: Strengthening;Balance Training;Functional Mobility Training;Transfer Training;ADL/Self-care Training; Endurance Training;Neuromuscular Re-education;Equipment Evaluation, Education, & procurement;Home Exercise Program;Safety Education & Training;Patient/Caregiver Education & Training;Stair training;Gait Training  Safety Devices  Type of devices:  All fall risk precautions in place     AMPAC (6 CLICK) BASIC MOBILITY  AM-PAC Inpatient Mobility Raw Score : 20     Therapy Time   Individual Time In 1350   Time Out 1413   Minutes 23     Timed Code Treatment Minutes: 23 Minutes     Tr [address] LEXY Chrystal Velez PTA, 02/27/20 at 2:18 PM         Definitions for assistance levels  Independent = pt does not require any physical supervision or assistance from another person for activity completion. Device may be needed.   Stand by assistance = pt requires verbal cues or instructions from another person, close to but not touching, to perform the activity  Minimal assistance= pt performs 75% or more of the activity; assistance is required to complete the activity  Moderate assistance= pt performs 50% of the activity; assistance is required to complete the activity  Maximal assistance = pt performs 25% of the activity; assistance is required to complete the activity  Dependent = pt requires total physical assistance to accomplish the task

## 2020-02-27 NOTE — CONSULTS
Inpatient consult to GI  Consult performed by: Rony Campos MD  Consult ordered by: Maria Isabel Walton MD          Patient Name: Roxanne Mclaughlin Date: 2020  5:52 PM  MR #: [id number]  : 10/6/1933    Attending Physician: Maria Isabel Walton MD  Reason for consult: Abnormal liver function test, abnormal CT scan of the abdomen    History of Presenting Illness:      Kael Phillips is a 80 y.o. female on hospital day 2 with a history of COPD, GI bleed. History Obtained From:  Patient, RN, EMR  Patient has no complaints at this time. She denies abdominal pain, N/V. She reports she feels hungry. No complaints of F/C. Not sure why she is admitted to the hospital.   She denies diarrhea, constipation, or bleeding. Patient somewhat of a poor historian. It appears the patient was admitted on 20 with N/V. Patient found covered in emesis and feces. She was febrile on admission (102.0). she presented to the ED with a productive cough and was diagnosed with pneumonia and placed on meropenum. It was also noted the patient has \"chronic  diarrhea\". Her lipase was also elevated (>600) on admission. RN reports patient has been afebrile. No reports of melena, hematochezia, or hematemesis. Patient with no further episodes of N/V while inpatient. No complaints of CP, SOB, or palpitations. History:      Past Medical History:   Diagnosis Date    COPD (chronic obstructive pulmonary disease) (HCC)     GI bleed      Past Surgical History:   Procedure Laterality Date    APPENDECTOMY      CARPAL TUNNEL RELEASE      [place] INCIS/DRAIN THIGH/KNEE ABSCESS,DEEP Left 2017    EXCISION AND LAYERED PLASTIC CLOSURE OF BCC LEFT ANTERIOR SHIN performed by Cassandra Almazan MD at [address] [place] [address] THYROID LOBECTOMY      TONSILLECTOMY AND ADENOIDECTOMY       Family History  Family History   Problem Relation Age of Onset    Cancer Son         ESOPHAGEAL     Social History     Socioeconomic History    Marital status:       Spouse name: Not 02/26/20 2326     PRN Meds:.ipratropium-albuterol, sodium chloride flush, acetaminophen, polyethylene glycol, promethazine, ondansetron   sodium chloride 75 mL/hr at 02/26/20 2326      Allergies: Allergies   Allergen Reactions    Penicillins       Review of Systems:       [x] CV, Resp, Neuro, , and all other systems reviewed and negative other than listed in HPI. Objective Findings:     Vitals:   Vitals:    02/26/20 0837 02/26/20 1222 02/26/20 1227 02/26/20 2051   BP: (!) 106/50   127/62   Pulse: 67   71   Resp:       Temp: 98.6 °F (37 °C)   97.5 °F (36.4 °C)   TempSrc: Oral      SpO2:    92%   Weight:   124 lb 5.4 oz (56.4 kg)    Height:  5' 2\" (1.575 m)        Physical Examination:  General: No apparent distress, A/O x2-3  HEENT: Normocephalic, no scleral icterus. Neck: No JVD. Heart: Regular, no murmur, no rub/gallop. No RV heave. Lungs: Clear to ascultation, no rales/wheezing/rhonchi. Good chest wall excursion. Abdomen: Appearance:, Distension -none, Soft , tenderness -RUQ, Bowel sounds normal   Extremities: No clubbing/cyanosis, no edema. Skin: Warm, dry, normal turgor, no rash, no bruise, no petichiae. Neuro: No myoclonus or tremor.    Psych: Normal affect    Results/ Medications reviewed 2/27/2020, 9:28 AM     Laboratory, Microbiology, Pathology, Radiology, Cardiology, Medications and Transcriptions reviewed  Scheduled Meds:   sodium chloride flush  10 mL Intravenous 2 times per day    enoxaparin  40 mg Subcutaneous Daily    nicotine  1 patch Transdermal Daily    meropenem  1 g Intravenous Q12H     Continuous Infusions:   sodium chloride 75 mL/hr at 02/26/20 2326       Recent Labs     02/25/20  1910 02/26/20  0628 02/27/20  0635   WBC 13.5* 9.0 7.3   HGB 15.6 13.0 12.8   HCT 48.6* 40.0 39.3   MCV 87.6 86.6 88.3    165 176     Recent Labs     02/25/20 1910 02/25/20 2033 02/26/20  0628 02/27/20  0635     --  143 145*   K 4.2  --  4.4 4.5     --  111* 113*   CO2 20  -- liver function tests and lipase were elevated. Lipase and liver function tests trending down. Patient is status post cholecystectomy. Findings suggestive of pancreatitis secondary to biliary calculi. She is afebrile without leukocytosis and abdominal pain. CT scan of the abdomen reports intra and extra hepatic biliary ductal dilation and mild pancreatic ductal dilation increased compared to previous study. Patient will require further imaging to evaluate the common bile duct and the pancreas. EUS likely to be the best modality, given that if a calculi is found on the common bile duct, ERCP can be performed under the same sedation. Plan:   - Continue course of care  - Monitor LFT and CBC  - EUS +/- ERCP to be scheduled Thursday 3/5/2020 in the OR  Comments: Thank you for allowing us to participate in the care of this patient. Will continue to follow. Please call if questions or concerns arise. Electronically signed by Betty Simons MD on 2/27/2020 at 9:28 AM    Please note this report has been partially produced using speech recognition software and may cause contain errors related to that system including grammar, punctuation and spelling as well as words and phrases that may seem inappropriate. If there are questions or concerns please feel free to contact me to clarify.

## 2020-02-27 NOTE — PROGRESS NOTES
Assessment documented. VSS. A&O*1 this shift. Affect is calm and cooperative if told what you are doing prior to doing it. Denies pain, n/v at this time. Refused repostioning at this time. Will continue to monitor.  Electronically signed by Freda Rodriguez RN on 2/26/2020 at 11:59 PM

## 2020-02-27 NOTE — PLAN OF CARE
Problem: Risk for Impaired Skin Integrity  Goal: Tissue integrity - skin and mucous membranes  Description  Structural intactness and normal physiological function of skin and  mucous membranes.   Outcome: Ongoing     Problem: Falls - Risk of:  Goal: Will remain free from falls  Description  Will remain free from falls  Outcome: Ongoing  Goal: Absence of physical injury  Description  Absence of physical injury  Outcome: Ongoing     Problem: Nutrition  Goal: Optimal nutrition therapy  Outcome: Ongoing     Problem: IP SWALLOWING  Goal: LTG - patient will tolerate the least restrictive diet consistency to allow for safe consumption of daily meals  2/27/2020 0406 by Kristen Slade RN  Outcome: Ongoing  2/26/2020 1450 by GILBERT Mcqueen  Outcome: Ongoing     Problem: Mobility - Impaired:  Goal: Mobility will improve  Description  Mobility will improve  Outcome: Ongoing

## 2020-02-27 NOTE — PROGRESS NOTES
Department of Internal Medicine  Progress Note      SUBJECTIVE: Patient is afebrile and hemodynamically stable. Asking to eat. No acute events overnight.        ROS:  All 12 systems reviewed and negative except mentioned in HPI and Assessment and plan    MEDICATIONS:  Current Facility-Administered Medications   Medication Dose Route Frequency Provider Last Rate Last Dose    ipratropium-albuterol (DUONEB) nebulizer solution 1 ampule  1 ampule Inhalation Q4H PRN Sarah Tellez MD        sodium chloride flush 0.9 % injection 10 mL  10 mL Intravenous 2 times per day LISE Banks   10 mL at 02/26/20 2325    sodium chloride flush 0.9 % injection 10 mL  10 mL Intravenous PRN LISE Finnegan        acetaminophen (TYLENOL) suppository 650 mg  650 mg Rectal Q6H PRN LISE Finnegan        polyethylene glycol (GLYCOLAX) packet 17 g  17 g Oral Daily PRN LISE Finnegan        promethazine (PHENERGAN) tablet 12.5 mg  12.5 mg Oral Q6H PRN LISE Finnegan        ondansetron [facility]) injection 4 mg  4 mg Intravenous Q6H PRN LISE Finnegan        enoxaparin (LOVENOX) injection 40 mg  40 mg Subcutaneous Daily LISE Finnegan   40 mg at 02/27/20 1116    0.9 % sodium chloride infusion   Intravenous Continuous Connor Negron MD 75 mL/hr at 02/27/20 1130      nicotine (NICODERM CQ) 21 MG/24HR 1 patch  1 patch Transdermal Daily LISE Banks   1 patch at 02/27/20 1128    meropenem (MERREM) 1 g in sodium chloride 0.9 % 100 mL IVPB (mini-bag)  1 g Intravenous Q12H LISE Finnegan 200 mL/hr at 02/27/20 1116 1 g at 02/27/20 1116         OBJECTIVE:  Vital Signs:  Vitals:    02/27/20 0750   BP: (!) 146/70   Pulse: 63   Resp: 16   Temp: 97.5 °F (36.4 °C)   SpO2: 94%       Focal exam:  No epigastric tenderness    General Exam (except as mentioned above):  CONSTITUTIONAL: Awake, alert, no apparent distress  EYES:  PERRL, conjunctiva

## 2020-02-27 NOTE — CARE COORDINATION
Met with pt and son Que Hicksronnell at bedside. Would like DC to [facility] [address], [address] [facility] [address] and [address] [facility]. Que Calles also meeting with Oscar Kirkland in HELP to complete Medicaid application. Initial referral made to [facility] and [facility] for review. [facility] cannot accept, but [facility] can.

## 2020-02-28 ENCOUNTER — TELEPHONE (OUTPATIENT)
Dept: GASTROENTEROLOGY | Age: 85
End: 2020-02-28

## 2020-02-28 VITALS
WEIGHT: 120.59 LBS | HEIGHT: 62 IN | OXYGEN SATURATION: 96 % | SYSTOLIC BLOOD PRESSURE: 142 MMHG | HEART RATE: 68 BPM | DIASTOLIC BLOOD PRESSURE: 72 MMHG | BODY MASS INDEX: 22.19 KG/M2 | RESPIRATION RATE: 17 BRPM | TEMPERATURE: 98.4 F

## 2020-02-28 LAB
ALBUMIN SERPL-MCNC: 2.5 G/DL (ref 3.5–4.6)
ALP BLD-CCNC: 132 U/L (ref 40–130)
ALT SERPL-CCNC: 46 U/L (ref 0–33)
AMYLASE: 85 U/L (ref 22–93)
ANION GAP SERPL CALCULATED.3IONS-SCNC: 14 MEQ/L (ref 9–15)
AST SERPL-CCNC: 38 U/L (ref 0–35)
BASOPHILS ABSOLUTE: 0.1 K/UL (ref 0–0.2)
BASOPHILS RELATIVE PERCENT: 1.1 %
BILIRUB SERPL-MCNC: 0.6 MG/DL (ref 0.2–0.7)
BILIRUBIN DIRECT: 0.3 MG/DL (ref 0–0.4)
BILIRUBIN, INDIRECT: 0.3 MG/DL (ref 0–0.6)
BUN BLDV-MCNC: 15 MG/DL (ref 8–23)
CALCIUM SERPL-MCNC: 8.4 MG/DL (ref 8.5–9.9)
CHLORIDE BLD-SCNC: 109 MEQ/L (ref 95–107)
CO2: 19 MEQ/L (ref 20–31)
CREAT SERPL-MCNC: 0.88 MG/DL (ref 0.5–0.9)
EOSINOPHILS ABSOLUTE: 0.1 K/UL (ref 0–0.7)
EOSINOPHILS RELATIVE PERCENT: 1.9 %
GFR AFRICAN AMERICAN: >60
GFR NON-AFRICAN AMERICAN: >60
GLUCOSE BLD-MCNC: 91 MG/DL (ref 70–99)
HCT VFR BLD CALC: 40.5 % (ref 37–47)
HEMOGLOBIN: 13.4 G/DL (ref 12–16)
LACTIC ACID: 0.9 MMOL/L (ref 0.5–2.2)
LYMPHOCYTES ABSOLUTE: 0.4 K/UL (ref 1–4.8)
LYMPHOCYTES RELATIVE PERCENT: 6.3 %
MCH RBC QN AUTO: 29 PG (ref 27–31.3)
MCHC RBC AUTO-ENTMCNC: 33.1 % (ref 33–37)
MCV RBC AUTO: 87.4 FL (ref 82–100)
MONOCYTES ABSOLUTE: 0.5 K/UL (ref 0.2–0.8)
MONOCYTES RELATIVE PERCENT: 8.1 %
NEUTROPHILS ABSOLUTE: 5.3 K/UL (ref 1.4–6.5)
NEUTROPHILS RELATIVE PERCENT: 82.6 %
PDW BLD-RTO: 14.7 % (ref 11.5–14.5)
PLATELET # BLD: 202 K/UL (ref 130–400)
POTASSIUM REFLEX MAGNESIUM: 3.6 MEQ/L (ref 3.4–4.9)
RBC # BLD: 4.63 M/UL (ref 4.2–5.4)
SODIUM BLD-SCNC: 142 MEQ/L (ref 135–144)
TOTAL PROTEIN: 6.1 G/DL (ref 6.3–8)
WBC # BLD: 6.4 K/UL (ref 4.8–10.8)

## 2020-02-28 PROCEDURE — 94640 AIRWAY INHALATION TREATMENT: CPT

## 2020-02-28 PROCEDURE — 85025 COMPLETE CBC W/AUTO DIFF WBC: CPT

## 2020-02-28 PROCEDURE — 83605 ASSAY OF LACTIC ACID: CPT

## 2020-02-28 PROCEDURE — 94761 N-INVAS EAR/PLS OXIMETRY MLT: CPT

## 2020-02-28 PROCEDURE — 36415 COLL VENOUS BLD VENIPUNCTURE: CPT

## 2020-02-28 PROCEDURE — 97535 SELF CARE MNGMENT TRAINING: CPT

## 2020-02-28 PROCEDURE — 92526 ORAL FUNCTION THERAPY: CPT

## 2020-02-28 PROCEDURE — 2700000000 HC OXYGEN THERAPY PER DAY

## 2020-02-28 PROCEDURE — 82150 ASSAY OF AMYLASE: CPT

## 2020-02-28 PROCEDURE — 80048 BASIC METABOLIC PNL TOTAL CA: CPT

## 2020-02-28 PROCEDURE — 80076 HEPATIC FUNCTION PANEL: CPT

## 2020-02-28 PROCEDURE — 2580000003 HC RX 258: Performed by: INTERNAL MEDICINE

## 2020-02-28 PROCEDURE — 97116 GAIT TRAINING THERAPY: CPT

## 2020-02-28 PROCEDURE — 6370000000 HC RX 637 (ALT 250 FOR IP): Performed by: INTERNAL MEDICINE

## 2020-02-28 PROCEDURE — 6360000002 HC RX W HCPCS: Performed by: PHYSICIAN ASSISTANT

## 2020-02-28 PROCEDURE — 2580000003 HC RX 258: Performed by: PHYSICIAN ASSISTANT

## 2020-02-28 RX ADMIN — ENOXAPARIN SODIUM 40 MG: 40 INJECTION SUBCUTANEOUS at 09:48

## 2020-02-28 RX ADMIN — SODIUM CHLORIDE: 9 INJECTION, SOLUTION INTRAVENOUS at 06:09

## 2020-02-28 RX ADMIN — DONEPEZIL HYDROCHLORIDE 10 MG: 10 TABLET, FILM COATED ORAL at 09:48

## 2020-02-28 RX ADMIN — MEROPENEM 1 G: 1 INJECTION, POWDER, FOR SOLUTION INTRAVENOUS at 10:18

## 2020-02-28 RX ADMIN — MEMANTINE HYDROCHLORIDE 10 MG: 10 TABLET ORAL at 09:48

## 2020-02-28 RX ADMIN — IPRATROPIUM BROMIDE AND ALBUTEROL SULFATE 1 AMPULE: .5; 3 SOLUTION RESPIRATORY (INHALATION) at 05:18

## 2020-02-28 ASSESSMENT — PAIN SCALES - GENERAL
PAINLEVEL_OUTOF10: 0
PAINLEVEL_OUTOF10: 0

## 2020-02-28 NOTE — DISCHARGE SUMMARY
Physician Discharge Summary     Patient ID:  Judy Brown  [id number]  30 y.o.  10/6/1933    Admit date: 2/25/2020    Discharge date and time: 2/28/2020    Admitting Physician: No admitting provider for patient encounter. Discharge Physician: Elly Curtis MD    Admission Diagnoses: Pneumonia [J18.9]    Discharge Diagnoses: Active Hospital Problems    Diagnosis Date Noted    Pneumonia [J18.9] 02/25/2020       Admission Condition: fair    Discharged Condition: good    Indication for Admission: Nausea and vomiting    Hospital Course: Patient was admitted with complaints of nausea and vomiting. Patient was diagnosed with acute pancreatitis and aspiration pneumonia secondary to nausea and vomiting. She was treated with antibiotics. Pancreatitis resolved, patient was able to tolerate diet on the day of discharge.   She was evaluated by GI who recommended outpatient endoscopic ultrasound once pneumonia resolved probably 1 week later  The patient was discharged in stable condition    Inpatient meds:  donepezil, 10 mg, Oral, Daily    memantine, 10 mg, Oral, BID    sodium chloride flush, 10 mL, Intravenous, 2 times per day    enoxaparin, 40 mg, Subcutaneous, Daily    nicotine, 1 patch, Transdermal, Daily    meropenem, 1 g, Intravenous, Q12H    Labs:  LABS  Recent Labs     02/26/20  0628 02/27/20  0635 02/28/20  0558   WBC 9.0 7.3 6.4   RBC 4.62 4.45 4.63   HGB 13.0 12.8 13.4   HCT 40.0 39.3 40.5   MCV 86.6 88.3 87.4   MCH 28.2 28.7 29.0   MCHC 32.5* 32.5* 33.1   RDW 14.8* 15.3* 14.7*    176 202       Recent Labs     02/26/20  0628 02/27/20  0635 02/28/20  0558    145* 142   K 4.4 4.5 3.6   * 113* 109*   CO2 21 19* 19*   BUN 22 21 15   CREATININE 0.94* 1.04* 0.88   GLUCOSE 95 64* 91   CALCIUM 8.1* 8.4* 8.4*       Recent Labs     02/25/20  1910   MG 1.9         Imaging: Ct Abdomen Pelvis W Iv Contrast Additional Contrast? None    Result Date: 2/26/2020  Abdomen and pelvis CT with IV contrast the colon with no sign of diverticulitis. No bowel obstruction, abscess or free fluid. No inflamed appendix although the normal appendix is not visualized. Stomach is collapsed and this gives the appearance of mural thickening but the findings are likely due to lack of distention Severe diffuse aortic, and pelvic vascular atherosclerotic calcifications. There is also noncalcified soft plaque. The aorta is dilated with the greatest degree of dilatation in the infrarenal segment measuring in the AP dimension approximately 3.7 cm on  the sagittal images and 3.4 cm on the axial images. This is significantly increased compared to the prior stud. Y. There are also atherosclerotic calcifications at the origins of the mesenteric and renal arteries and ends significant stenosis in these locations are not excluded with evaluation limited as this is not an angiographic technique. Mitral annular calcifications. Moderate to severe levoscoliosis lumbar spine. Severe diffuse spondylitic changes involving the discs and the facet joints with areas of abnormal alignment most pronounced at the L4-5 level. There is osteopenia with areas of slightly more pronounced focal bony density at T10 through the T12 level most likely representing sparing from osteopenia. Osseous lesions are not excluded but considered less likely. Similar sagittal reconstructions are not available on the previous study for comparison. 1. Right posterior lower lobe infiltrate. Minimal left basilar infiltrate or atelectasis. 2. Severe diffuse atherosclerotic disease. Aneurysmal dilatation of the abdominal aorta measuring 3.7 cm in AP dimension increasing in size since 12/7/2007. Atherosclerosis involves the visceral vessel origins and stenosis in these locations is not excluded. 3. Intra and extrahepatic biliary ductal dilatation and mild pancreatic ductal dilatation increased compared to the previous study.  The patient is status post cholecystectomy and ductal

## 2020-02-28 NOTE — PROGRESS NOTES
Assessment completed this ahift. Alert and oriented x4. Confused at times and impulsive. AVASYS for safety. Denies pain or nausea at present. Lungs clear. 96 % on 2L NC.    Electronically signed by Judie Morris RN on 2/28/2020 at 1:12 PM

## 2020-02-28 NOTE — PROGRESS NOTES
Nutrition Assessment    Type and Reason for Visit: Initial, Positive Nutrition Screen(wt loss/wounds)    Nutrition Recommendations:   Continue Diet Dysphagia Minced and Moist, Low Fat    Start High Calorie ONS TID    Nutrition Assessment: Pt improving from a nutrition standpoint with initiation of oral diet. at risk for malnutrition due to diet modified in consistency for dysphagia and increased nutrient needs for wound healing. Will start a nutrition supplement    Malnutrition Assessment:  · Malnutrition Status: Insufficient data  · Context: Chronic illness  · Findings of the 6 clinical characteristics of malnutrition (Minimum of 2 out of 6 clinical characteristics is required to make the diagnosis of moderate or severe Protein Calorie Malnutrition based on AND/ASPEN Guidelines):  1. Energy Intake-Unable to assess, Unable to assess    2. Weight Loss-Unable to assess,    3. Fat Loss-Unable to assess,    4. Muscle Loss-Unable to assess,    5. Fluid Accumulation-Unable to assess,    6.  Strength-Not measured    Nutrition Risk Level: High    Nutrient Needs:  · Estimated Daily Total Kcal: 1259-0732 (kg x 28-30)  · Estimated Daily Protein (g): 67-78 g (kg x 1.2-1.4)  · Estimated Daily Total Fluid (ml/day): ~1600 ml (1 ml/kcal)    Nutrition Diagnosis:   · Problem: Increased nutrient needs  · Etiology: related to Increased demand for energy/nutrients     Signs and symptoms:  as evidenced by Presence of wounds    Objective Information:  · Nutrition-Focused Physical Findings: peripheral line. IVF D/C'd.  1+ BLE edema noted. I/O: 2401/0. AVASYS remains. Pt sleeping. No family present  · Wound Type: Stage I, Pressure Ulcer(kaye heels.   redness/rash to buttocks)  · Current Nutrition Therapies:  · Oral Diet Orders: Dysphagia Minced and Moist (Dysphagia 2)   · Oral Diet intake: %(per documentaion outside door, 1-25% per flowsheet documentation)  · Oral Nutrition Supplement (ONS) Orders: None  · Anthropometric

## 2020-02-28 NOTE — PROGRESS NOTES
Called report to [facility], nurse Fuchs. Scheduled p/u time 1400. Dr Milady Mcgrath to schedule EUS and ERCP on 3/5/2020 with office.   Electronically signed by Johana Meyers RN on 2/28/2020 at 1:47 PM

## 2020-02-28 NOTE — PROGRESS NOTES
Pt assessment and vitals complete and stable. Pt alert and oriented to self, per daughter at her baseline. Pt incontinent of large amount of stool and urine this shift, bath provided and linens changed. Will continue to monitor.

## 2020-02-28 NOTE — PROGRESS NOTES
Department of Internal Medicine  Progress Note      SUBJECTIVE: Patient is afebrile and hemodynamically stable. Tolerated her breakfast very well this morning. She is AO x1/2. No acute events overnight.        ROS:  All 12 systems reviewed and negative except mentioned in HPI and Assessment and plan    MEDICATIONS:  Current Facility-Administered Medications   Medication Dose Route Frequency Provider Last Rate Last Dose    donepezil (ARICEPT) tablet 10 mg  10 mg Oral Daily Connor Negron MD   10 mg at 02/28/20 0948    loperamide (IMODIUM) capsule 2 mg  2 mg Oral 4x Daily PRN Connor Shrestha MD        memantine (NAMENDA) tablet 10 mg  10 mg Oral BID Connor Shrestha MD   10 mg at 02/28/20 0948    ipratropium-albuterol (DUONEB) nebulizer solution 1 ampule  1 ampule Inhalation Q4H PRN Tyree Woodward MD   1 ampule at 02/28/20 0518    sodium chloride flush 0.9 % injection 10 mL  10 mL Intravenous 2 times per day LISE Gonsalez   10 mL at 02/26/20 2325    sodium chloride flush 0.9 % injection 10 mL  10 mL Intravenous PRN Js Cuellar        acetaminophen (TYLENOL) suppository 650 mg  650 mg Rectal Q6H PRN LISE Cuellar        polyethylene glycol [facility]) packet 17 g  17 g Oral Daily PRN LISE Cuellar        promethazine (PHENERGAN) tablet 12.5 mg  12.5 mg Oral Q6H PRN LISE Cuellar        ondansetron [facility]) injection 4 mg  4 mg Intravenous Q6H PRN LISE Cuellar        enoxaparin (LOVENOX) injection 40 mg  40 mg Subcutaneous Daily LISE Cuellar   40 mg at 02/28/20 0948    nicotine (NICODERM CQ) 21 MG/24HR 1 patch  1 patch Transdermal Daily Js Gonsalez   Stopped at 02/28/20 1011    meropenem (MERREM) 1 g in sodium chloride 0.9 % 100 mL IVPB (mini-bag)  1 g Intravenous Q12H Js Gonsalez   Stopped at 02/28/20 1102         OBJECTIVE:  Vital Signs:  Vitals:    02/28/20 0519   BP:    Pulse:    Resp:    Temp: SpO2: 95%       Focal exam:  No epigastric tenderness    General Exam (except as mentioned above):  CONSTITUTIONAL: Awake, alert, no apparent distress  EYES:  PERRL, conjunctiva normal  ENT:  Normocephalic, atraumatic  NECK:  Supple  BACK:  Symmetric  LUNGS:  CTAB except bilateral basilar crackles. CARDIOVASCULAR:  S1S2 present  ABDOMEN:  soft, non-distended, non-tender  MUSCULOSKELETAL:  There is no redness, warmth, or swelling of the joints. NEUROLOGIC:  Alert, awake, oriented x 3. No FND  EXTREMITIES: Warm and well perfused. LABS  Recent Labs     02/26/20 0628 02/27/20  0635 02/28/20  0558   WBC 9.0 7.3 6.4   RBC 4.62 4.45 4.63   HGB 13.0 12.8 13.4   HCT 40.0 39.3 40.5   MCV 86.6 88.3 87.4   MCH 28.2 28.7 29.0   MCHC 32.5* 32.5* 33.1   RDW 14.8* 15.3* 14.7*    176 202       Recent Labs     02/26/20 0628 02/27/20  0635 02/28/20  0558    145* 142   K 4.4 4.5 3.6   * 113* 109*   CO2 21 19* 19*   BUN 22 21 15   CREATININE 0.94* 1.04* 0.88   GLUCOSE 95 64* 91   CALCIUM 8.1* 8.4* 8.4*       Recent Labs     02/25/20  1910   MG 1.9           ASSESSMENT AND PLAN    Active Hospital Problems    Diagnosis Date Noted    Pneumonia [J18.9] 02/25/2020     -Aspiration pneumonia 2 more days of meropenem:   -Pancreatitis: Mostly gallstone. Patient to get endoscopic ultrasound as outpatient 1 week later  -Dementia continue home medications  Sepsis ruled out  -CT scan of the abdomen showed abdominal aortic aneurysm 3.7 cm which has increased in size as compared to 30 years ago. Discussed this with the son who was unaware of this diagnosis. Patient will need follow-up ultrasound of abdomen 6 months later to check the growth of the aneurysm. Patient however is a poor surgical candidate given dementia, age. Have discussed this with the son.     We will discharge today    DVT prophylaxis: Sean Best MD  Pager : [phone]

## 2020-03-01 LAB
BLOOD CULTURE, ROUTINE: NORMAL
CULTURE, BLOOD 2: NORMAL

## 2020-03-02 LAB — VITAMIN B-12: 238

## 2020-03-02 NOTE — TELEPHONE ENCOUNTER
Saw Robb at [facility] called wanting to know if patient needs to hold her Lovenox for the EUS/ERCP scheduled for Thursday.

## 2020-03-02 NOTE — TELEPHONE ENCOUNTER
Called Katelin at [facility] and notified her that Lovenox should be held 24 hours prior to her procedure.

## 2020-03-06 ENCOUNTER — OFFICE VISIT (OUTPATIENT)
Dept: GERIATRIC MEDICINE | Age: 85
End: 2020-03-06

## 2020-03-09 LAB
BASOPHILS ABSOLUTE: NORMAL
BASOPHILS RELATIVE PERCENT: NORMAL
BUN BLDV-MCNC: 19 MG/DL
CALCIUM SERPL-MCNC: 8.5 MG/DL
CHLORIDE BLD-SCNC: 111 MMOL/L
CO2: 21 MMOL/L
CREAT SERPL-MCNC: 0.9 MG/DL
EOSINOPHILS ABSOLUTE: NORMAL
EOSINOPHILS RELATIVE PERCENT: NORMAL
GFR CALCULATED: 59
GLUCOSE BLD-MCNC: 58 MG/DL
HCT VFR BLD CALC: 43.8 % (ref 36–46)
HEMOGLOBIN: 13.6 G/DL (ref 12–16)
LYMPHOCYTES ABSOLUTE: NORMAL
LYMPHOCYTES RELATIVE PERCENT: NORMAL
MCH RBC QN AUTO: 27.7 PG
MCHC RBC AUTO-ENTMCNC: 31.2 G/DL
MCV RBC AUTO: 88.8 FL
MONOCYTES ABSOLUTE: NORMAL
MONOCYTES RELATIVE PERCENT: NORMAL
NEUTROPHILS ABSOLUTE: NORMAL
NEUTROPHILS RELATIVE PERCENT: NORMAL
PLATELET # BLD: 268 K/ΜL
PMV BLD AUTO: 1.07 FL
POTASSIUM SERPL-SCNC: 4.4 MMOL/L
RBC # BLD: 4.94 10^6/ΜL
SODIUM BLD-SCNC: 143 MMOL/L
WBC # BLD: 7.9 10^3/ML

## 2020-03-11 ENCOUNTER — TELEPHONE (OUTPATIENT)
Dept: GASTROENTEROLOGY | Age: 85
End: 2020-03-11

## 2020-03-16 ENCOUNTER — OFFICE VISIT (OUTPATIENT)
Dept: GERIATRIC MEDICINE | Age: 85
End: 2020-03-16
Payer: MEDICARE

## 2020-03-16 PROCEDURE — 1123F ACP DISCUSS/DSCN MKR DOCD: CPT | Performed by: NURSE PRACTITIONER

## 2020-03-16 PROCEDURE — 99309 SBSQ NF CARE MODERATE MDM 30: CPT | Performed by: NURSE PRACTITIONER

## 2020-03-16 PROCEDURE — G8482 FLU IMMUNIZE ORDER/ADMIN: HCPCS | Performed by: NURSE PRACTITIONER

## 2020-03-26 VITALS
SYSTOLIC BLOOD PRESSURE: 124 MMHG | DIASTOLIC BLOOD PRESSURE: 84 MMHG | TEMPERATURE: 98.2 F | WEIGHT: 134 LBS | HEART RATE: 76 BPM | BODY MASS INDEX: 24.51 KG/M2 | RESPIRATION RATE: 16 BRPM | OXYGEN SATURATION: 95 %

## 2020-03-26 NOTE — PROGRESS NOTES
Sno Don : 10/06/1933 DOS: 2020     FROY ELISE    Patient is here for PT/OT rehabilitation after she had a hospital stay for pneumonia, acute pancreatitis, and dehydration. She has a history of COPD and dementia. She is having diarrhea. She has a fever of 102, cough, nausea, vomiting. There is some gastroenteritis in the facility. Patient has had recent pneumonia. She has risk factors for C. diff. Nursing states it is not watery, but it is fairly frequent. Abdomen is tender to touch and patient has no appetite per her report. She is able to make some complaints for herself, but she is having some issues because of dementia overall. We will keep her in a room in isolation until the results are back. She is eating poorly, but drinking well. She can be redirected. She is rather frail overall. No palpitations, dizziness, no chest pain. No change in vision. No choking or suspected aspiration. MEDICATION AND ALLERGIES: REVIEWED IN THE NF CHART    FAMILY MEDICAL &  SOCIAL HISTORY: SEE EPIC H & P    Past Medical History:   Diagnosis Date    COPD (chronic obstructive pulmonary disease) ([facility] [address]     GI bleed      Lab Results   Component Value Date     2020    K 4.4 2020    K 3.6 2020     2020    CO2 21 2020    BUN 19 2020    CREATININE 0.9 2020    GLUCOSE 58 2020    GLUCOSE 95 2012    CALCIUM 8.5 2020      Lab Results   Component Value Date    WBC 7.9 2020    HGB 13.6 2020    HCT 43.8 2020    MCV 88.8 2020     2020       PHYSICAL EXAMINATION: 98.2, 76, 16, 95% O2 saturation on room air, blood pressure 124/84, weight is 134 pounds. She is sitting up in chair in no acute distress. She is awake, alert, pleasant, looks frail, looks ill. Heart was regular. No S3, S4. Lungs are clear to auscultation, even, unlabored respirations. Abdomen: Slightly tender to touch. No bloating.  No distention. Positive bowel sounds. Lower extremities: Trace edema bilaterally in the pedal area. She is forgetful. She can answer simple questions. Her speech is clear, but she is little bit slow to respond. ASSESSMENT AND PLAN: Diarrhea, fever of 102, with cough and she is having some nausea and vomiting. There is a gastroenteritis going around the building. We will work her up for C. diff. LABs. She had pneumonia and acute pancreatitis with dehydration. We will encourage food and fluids and continue to follow. FACE TO FACE 2-3pm  I spent greater than 35 minutes with the patient and greater than 50% of the time was spent counseling and/or coordinating care for multiple complex medical problem for diagnosis of high fever, diarrhea and cough, reviewing hospital charts, labs; discussion with the therapists and nursing regarding rehab POC.      Electronically Signed By: MIGNON Harvey GNP-BC on 03/23/2020 16:35:18  ______________________________  MIGNON Harvey GNP-BC  /[id number]  D: 03/22/2020 22:26:55  T: 03/23/2020 38:08:58    cc: - Soledad Cantrell 70.

## 2020-03-28 NOTE — PROGRESS NOTES
[facility] CHANTE VILLEDA  3/16/2020    The patient is being seen for nausea and vomiting. The patient is very thin and frail. She is not eating well in the first place. She is here for aspiration pneumonia after hospitalization and she also has C. diff unfortunately. She is still having diarrhea per her report, poor appetite, but she states she does not feel that bad. She has dementia of the Alzheimer's type, GI bleed, COPD. She states the nausea and vomiting is gone, it was just from this morning. No shortness of breath or chest pain. No increased coughing. No bloody diarrhea. She states she has had 3 diarrheal stools already today, but she also states she has chronic diarrhea. So, she is not sure what is going on. She did have a positive culture on the 7th. MEDICATIONS AND ALLERGIES: Reviewed in the nursing facility chart. Past Medical History:   Diagnosis Date    COPD (chronic obstructive pulmonary disease) ([facility])     GI bleed        PHYSICAL EXAMINATION: VITAL SIGNS: 97.8, 70, 16, 112/65, 133 pounds. She is sitting up in the chair in the room. She is isolated. She looks okay. She is little thin, but she does not look toxic. Buccal mucosa is actually moist and pink without any signs of dehydration. She is pleasant. She is awake, alert. She is conversational answering questions appropriately and following simple commands. She is little bit repetitive, but she is fairly high functioning. Heart is regular. Lungs are clear. Abdomen is nontender. Positive bowel sounds. Lower extremities have no edema. ASSESSMENT AND PLAN: Diarrhea continues. She has history of C. diff may be chronic diarrhea. Nausea and vomiting is resolved. Aspiration pneumonia resolved. COPD stable. Dementia of the Alzheimer's type. POC, medications, labs reviewed. We will continue to follow.    Kingsley Arce, APRN-CNP      Kingsley Arce, DNP, MSN, RN, GNP-BC, NP-C  Adult/Chuck Nurse Practitioner  [facility]) Physicians,

## 2020-04-13 ENCOUNTER — OFFICE VISIT (OUTPATIENT)
Dept: GERIATRIC MEDICINE | Age: 85
End: 2020-04-13
Payer: MEDICARE

## 2020-04-13 PROCEDURE — 99309 SBSQ NF CARE MODERATE MDM 30: CPT | Performed by: INTERNAL MEDICINE

## 2020-04-13 PROCEDURE — 1123F ACP DISCUSS/DSCN MKR DOCD: CPT | Performed by: INTERNAL MEDICINE

## 2020-04-24 ENCOUNTER — VIRTUAL VISIT (OUTPATIENT)
Dept: GERIATRIC MEDICINE | Age: 85
End: 2020-04-24
Payer: MEDICARE

## 2020-04-24 PROCEDURE — 1123F ACP DISCUSS/DSCN MKR DOCD: CPT | Performed by: NURSE PRACTITIONER

## 2020-04-24 PROCEDURE — 99308 SBSQ NF CARE LOW MDM 20: CPT | Performed by: NURSE PRACTITIONER

## 2020-04-30 ENCOUNTER — OFFICE VISIT (OUTPATIENT)
Dept: GERIATRIC MEDICINE | Age: 85
End: 2020-04-30
Payer: MEDICARE

## 2020-04-30 PROCEDURE — 1123F ACP DISCUSS/DSCN MKR DOCD: CPT | Performed by: INTERNAL MEDICINE

## 2020-04-30 PROCEDURE — 99304 1ST NF CARE SF/LOW MDM 25: CPT | Performed by: INTERNAL MEDICINE

## 2020-05-05 ENCOUNTER — VIRTUAL VISIT (OUTPATIENT)
Dept: GERIATRIC MEDICINE | Age: 85
End: 2020-05-05
Payer: MEDICARE

## 2020-05-05 PROCEDURE — 99308 SBSQ NF CARE LOW MDM 20: CPT | Performed by: NURSE PRACTITIONER

## 2020-05-05 PROCEDURE — 1123F ACP DISCUSS/DSCN MKR DOCD: CPT | Performed by: NURSE PRACTITIONER

## 2020-05-10 VITALS
RESPIRATION RATE: 17 BRPM | SYSTOLIC BLOOD PRESSURE: 123 MMHG | WEIGHT: 124 LBS | TEMPERATURE: 97.3 F | OXYGEN SATURATION: 95 % | BODY MASS INDEX: 22.68 KG/M2 | HEART RATE: 66 BPM | DIASTOLIC BLOOD PRESSURE: 71 MMHG

## 2020-05-10 NOTE — PROGRESS NOTES
[address] [facility]. [place], [place]      4/24/2020    Chicot Luz  is a 80 y.o. in the NF being seen for a f/u of   Chief Complaint   Patient presents with    Diarrhea     chronic       Marlon Luz is a 80 y.o. female being evaluated by a Virtual Visit (video visit) encounter to address concerns as mentioned above. A caregiver was present when appropriate. Due to this being a TeleHealth encounter (During JIW-93 public health emergency), evaluation of the following organ systems was limited: Vitals/Constitutional/EENT/Resp/CV/GI//MS/Neuro/Skin/Heme-Lymph-Imm. Pursuant to the emergency declaration under the [address] [place] [address] authority and the [employer] and [employer] Act, this Virtual Visit was conducted with patient's (and/or legal guardian's) consent, to reduce the risk of exposure to COVID-19 and provide necessary medical care. Total time spent on this encounter: Not billed by time    Services were provided through a video synchronous discussion virtually to substitute for in-person encounter. --Mesha Winston, APRN - CNP on 5/10/2020 at 7:24 PM    An electronic signature was used to authenticate this note. HPI lives in LTC Her diarrhea is improved, good BMs, and continues to be incontinent at baseline. . Reports from nurse, pt is at baseline, no change in vision, hearing. No headaches or choking issues. No decrease in appetite, no change in B/B habits. No pain crises. NO fevers. Has dementia but no recent progression and can make needs known to nurse.     Past Medical History:   Diagnosis Date    COPD (chronic obstructive pulmonary disease) ([facility] [address]     GI bleed      Past Surgical History:   Procedure Laterality Date    APPENDECTOMY      CARPAL TUNNEL RELEASE      [place] INCIS/DRAIN THIGH/KNEE ABSCESS,DEEP Left 11/20/2017    EXCISION AND LAYERED PLASTIC CLOSURE OF BCC LEFT ANTERIOR PICKENS performed by Ayaka Vazquez MD at [address] [place] [address]       Family History   Problem Relation Age of Onset    Cancer Son         ESOPHAGEAL     Social History     Socioeconomic History    Marital status:      Spouse name: Not on file    Number of children: Not on file    Years of education: Not on file    Highest education level: Not on file   Occupational History    Not on file   Social Needs    Financial resource strain: Not on file    Food insecurity     Worry: Not on file     Inability: Not on file    Transportation needs     Medical: Not on file     Non-medical: Not on file   Tobacco Use    Smoking status: Current Every Day Smoker     Packs/day: 0.50    Smokeless tobacco: Never Used   Substance and Sexual Activity    Alcohol use: No    Drug use: No    Sexual activity: Not on file   Lifestyle    Physical activity     Days per week: Not on file     Minutes per session: Not on file    Stress: Not on file   Relationships    Social connections     Talks on phone: Not on file     Gets together: Not on file     Attends Protestant service: Not on file     Active member of club or organization: Not on file     Attends meetings of clubs or organizations: Not on file     Relationship status: Not on file    Intimate partner violence     Fear of current or ex partner: Not on file     Emotionally abused: Not on file     Physically abused: Not on file     Forced sexual activity: Not on file   Other Topics Concern    Not on file   Social History Narrative    Not on file       Allergies: Penicillins  NF MEDICATIONS REVIEWED    ROS:  See HPI  Constitutional: There are no reports of behavioral issues, change in appetite, fever No gross bleeding issues. Respiratory: denies SOB, ,   Cardiovascular: denies CP,  GI: No reports of change of bowel habits.    : no reports of change in bladder habits  Extremities: No reports of pain issues, no

## 2020-05-14 NOTE — PROGRESS NOTES
Date    COPD (chronic obstructive pulmonary disease) ([facility] [address]     GI bleed      Past Surgical History:   Procedure Laterality Date    APPENDECTOMY      CARPAL TUNNEL RELEASE      [place] INCIS/DRAIN THIGH/KNEE ABSCESS,DEEP Left 11/20/2017    EXCISION AND LAYERED PLASTIC CLOSURE OF BCC LEFT ANTERIOR SHIN performed by Katerina García MD at [address] [place] [address] THYROID LOBECTOMY      TONSILLECTOMY AND ADENOIDECTOMY       Family History   Problem Relation Age of Onset    Cancer Son         ESOPHAGEAL     Social History     Socioeconomic History    Marital status:       Spouse name: Not on file    Number of children: Not on file    Years of education: Not on file    Highest education level: Not on file   Occupational History    Not on file   Social Needs    Financial resource strain: Not on file    Food insecurity     Worry: Not on file     Inability: Not on file    Transportation needs     Medical: Not on file     Non-medical: Not on file   Tobacco Use    Smoking status: Current Every Day Smoker     Packs/day: 0.50    Smokeless tobacco: Never Used   Substance and Sexual Activity    Alcohol use: No    Drug use: No    Sexual activity: Not on file   Lifestyle    Physical activity     Days per week: Not on file     Minutes per session: Not on file    Stress: Not on file   Relationships    Social connections     Talks on phone: Not on file     Gets together: Not on file     Attends Zoroastrianism service: Not on file     Active member of club or organization: Not on file     Attends meetings of clubs or organizations: Not on file     Relationship status: Not on file    Intimate partner violence     Fear of current or ex partner: Not on file     Emotionally abused: Not on file     Physically abused: Not on file     Forced sexual activity: Not on file   Other Topics Concern    Not on file   Social History Narrative    Not on file       Allergies: Penicillins  NF MEDICATIONS REVIEWED    ROS:  See HPI  Constitutional:

## 2020-05-15 ENCOUNTER — VIRTUAL VISIT (OUTPATIENT)
Dept: GERIATRIC MEDICINE | Age: 85
End: 2020-05-15
Payer: MEDICARE

## 2020-05-15 PROCEDURE — 99308 SBSQ NF CARE LOW MDM 20: CPT | Performed by: NURSE PRACTITIONER

## 2020-05-15 PROCEDURE — 1123F ACP DISCUSS/DSCN MKR DOCD: CPT | Performed by: NURSE PRACTITIONER

## 2020-05-17 VITALS
WEIGHT: 124 LBS | SYSTOLIC BLOOD PRESSURE: 199 MMHG | OXYGEN SATURATION: 96 % | HEART RATE: 73 BPM | RESPIRATION RATE: 16 BRPM | DIASTOLIC BLOOD PRESSURE: 69 MMHG | TEMPERATURE: 97.6 F | BODY MASS INDEX: 22.68 KG/M2

## 2020-05-25 NOTE — PROGRESS NOTES
[address] [facility]. [place], [place] Banuelos [place]    5/15/2020    Krystal Deutsch  is a 80 y.o. in the NF being seen for a f/u of   Chief Complaint   Patient presents with    1 Month [address] Juan Manuel Haines is a 80 y.o. female being evaluated by a Virtual Visit (video visit) encounter to address concerns as mentioned above. A caregiver was present when appropriate. Due to this being a TeleHealth encounter (During [facility] Health[facility]-94 public health emergency), evaluation of the following organ systems was limited: Vitals/Constitutional/EENT/Resp/CV/GI//MS/Neuro/Skin/Heme-Lymph-Imm. Pursuant to the emergency declaration under the [address] [place] [address] authority and the [employer] and [employer] Act, this Virtual Visit was conducted with patient's (and/or legal guardian's) consent, to reduce the risk of exposure to COVID-19 and provide necessary medical care. Total time spent on this encounter: Not billed by time    Services were provided through a video synchronous discussion virtually to substitute for in-person encounter. --Augusto Turner, JOSEFINA - CNP on 5/31/2020 at 5:06 PM    An electronic signature was used to authenticate this note. HPI lives in [address] [place] [address]  Being seen monthly for chronic illnesses. Reports from nurse, pt is at baseline, no change in vision, hearing. No headaches or choking issues. Uses wheel chair for locomotion in facility. Has a poor appetite, no change in B/B habits with history of chronic diarrhea and lactose intolerance. No pain crises. NO fevers. Has dementia but no recent progression and can make needs known to nurse.      Past Medical History:   Diagnosis Date    COPD (chronic obstructive pulmonary disease) ([facility] [address]     GI bleed      Past Surgical History:   Procedure Laterality Date    APPENDECTOMY      CARPAL TUNNEL RELEASE      [place] INCIS/DRAIN THIGH/KNEE ABSCESS,DEEP Left 11/20/2017 EXCISION AND LAYERED PLASTIC CLOSURE OF BCC LEFT ANTERIOR PICKENS performed by Cassy Rendon MD at [address] [place] [address] AND ADENOIDECTOMY       Family History   Problem Relation Age of Onset    Cancer Son         ESOPHAGEAL     Social History     Socioeconomic History    Marital status:      Spouse name: Not on file    Number of children: Not on file    Years of education: Not on file    Highest education level: Not on file   Occupational History    Not on file   Social Needs    Financial resource strain: Not on file    Food insecurity     Worry: Not on file     Inability: Not on file    Transportation needs     Medical: Not on file     Non-medical: Not on file   Tobacco Use    Smoking status: Current Every Day Smoker     Packs/day: 0.50    Smokeless tobacco: Never Used   Substance and Sexual Activity    Alcohol use: No    Drug use: No    Sexual activity: Not on file   Lifestyle    Physical activity     Days per week: Not on file     Minutes per session: Not on file    Stress: Not on file   Relationships    Social connections     Talks on phone: Not on file     Gets together: Not on file     Attends Alevism service: Not on file     Active member of club or organization: Not on file     Attends meetings of clubs or organizations: Not on file     Relationship status: Not on file    Intimate partner violence     Fear of current or ex partner: Not on file     Emotionally abused: Not on file     Physically abused: Not on file     Forced sexual activity: Not on file   Other Topics Concern    Not on file   Social History Narrative    Not on file       Allergies: Penicillins  NF MEDICATIONS REVIEWED    ROS:  See HPI  Constitutional: There are no reports of behavioral issues, change in appetite, fever, or worsening weakness. No gross bleeding issues.   Respiratory: denies SOB, dyspnea, dyspnea on exertion,   Cardiovascular: denies CP, lightheadedness,   GI: No reports of change of bowel habits, no N/V/C.   : no reports of change in bladder habits  Extremities: No reports of pain issues, no edema    Physical exam:   /69   Pulse 73   Temp 96.8 °F (36 °C)   Resp 16   Wt 122 lb (55.3 kg)   BMI 22.31 kg/m²   Constitutional: Awake and alert sitting up in bed, she is very thin and frail in appearance Pink lips and mucous membranes no pallor noted,   HEENT: normocephalic, PEAR, MMM, no cyanosis. NO neck mass visualized , facial symmetry intact. Cardiovascular: Regular rate  Respiratory: unlabored respirations, no accessory muscle use noted  GI: abdomen ND  Extremities: no edema,  Psych: pleasant and able to follow simple commands, normal thought content, speech clear    ASSESSMENT:     Diagnosis Orders   1. Ataxia     2. Late onset Alzheimer's disease without behavioral disturbance ([facility] [address]         PLAN:   1. She is in a wheelchair she is not to get up for transfers without assistance but she does have dementia and does not remember she has no safety awareness and poor judgment  2. No further progression in Alzheimer's she does not have any issues at this time with her behaviors  Return in about 1 month (around 6/15/2020). Pertinent POC, labs, have been reviewed, continue same. Encourage fluids and good nutrition. Stress fall prevention strategies.     JOSEFINA Blank-CNP      Junior Arreola DNP, MSN, RN, GNP-BC, NP-C  Adult/Chuck Nurse Practitioner  [address], [facility]  Department of Geriatrics  8:30am-4:30pm   [phone]  After hours Answering service [phone]

## 2020-05-31 VITALS
BODY MASS INDEX: 22.31 KG/M2 | SYSTOLIC BLOOD PRESSURE: 119 MMHG | RESPIRATION RATE: 16 BRPM | DIASTOLIC BLOOD PRESSURE: 69 MMHG | TEMPERATURE: 96.8 F | HEART RATE: 73 BPM | WEIGHT: 122 LBS

## 2020-05-31 PROBLEM — J18.9 PNEUMONIA: Status: RESOLVED | Noted: 2020-02-25 | Resolved: 2020-05-31

## 2020-06-11 ENCOUNTER — VIRTUAL VISIT (OUTPATIENT)
Dept: GERIATRIC MEDICINE | Age: 85
End: 2020-06-11
Payer: MEDICARE

## 2020-06-11 PROCEDURE — 1123F ACP DISCUSS/DSCN MKR DOCD: CPT | Performed by: NURSE PRACTITIONER

## 2020-06-11 PROCEDURE — 99308 SBSQ NF CARE LOW MDM 20: CPT | Performed by: NURSE PRACTITIONER

## 2020-06-16 LAB — ALBUMIN: 3.4

## 2020-06-23 NOTE — PROGRESS NOTES
abused: Not on file     Physically abused: Not on file     Forced sexual activity: Not on file   Other Topics Concern    Not on file   Social History Narrative    Not on file       Allergies: Penicillins  NF MEDICATIONS REVIEWED    ROS:  See HPI  Constitutional: There are no reports of behavioral issues, fever, or weakness. No gross bleeding issues. Respiratory: denies SOB, dyspnea, dyspnea on exertion,   Cardiovascular: denies CP, lightheadedness,   GI: No reports of change of bowel habits, no N/V/D/C. : no reports of change in bladder habits  Extremities: No reports of pain issues, no edema    Physical exam:   /68   Pulse 63   Temp 97.4 °F (36.3 °C)   Resp 16   Wt 126 lb (57.2 kg)   SpO2 95%   BMI 23.05 kg/m²   Constitutional: sleeping comfortably in room,   HEENT: normocephalic, no cyanosis. NO neck mass visualized , facial symmetry intact. Cardiovascular: Regular rate  Respiratory: unlabored respirations, no accessory muscle use noted  GI: abdomen ND  Extremities: no edema,  Psych: Resting comfortably no restlessness noted during sleep no acute distress    ASSESSMENT:     Diagnosis Orders   1. Late onset Alzheimer's disease without behavioral disturbance ([facility] [address]     2. Chronic diarrhea         PLAN:   1. Hospice no behaviors anxiety is under control no pain crises  2. Diarrhea is under control    Return if symptoms worsen or fail to improve. Pertinent POC, labs, have been reviewed, continue same. Encourage fluids and good nutrition. Stress fall prevention strategies.     Napoleon Lira, APRN-CNP      Napoleon Lira, DNP, MSN, RN, GNP-BC, NP-C  Adult/Chuck Nurse Practitioner  [address], [facility]  Department of Geriatrics  8:30am-4:30pm   [phone]  After hours Answering service [phone]

## 2020-06-26 ENCOUNTER — OFFICE VISIT (OUTPATIENT)
Dept: GERIATRIC MEDICINE | Age: 85
End: 2020-06-26
Payer: MEDICARE

## 2020-06-26 PROCEDURE — 99308 SBSQ NF CARE LOW MDM 20: CPT | Performed by: NURSE PRACTITIONER

## 2020-06-26 PROCEDURE — 1123F ACP DISCUSS/DSCN MKR DOCD: CPT | Performed by: NURSE PRACTITIONER

## 2020-06-28 VITALS
WEIGHT: 126 LBS | BODY MASS INDEX: 23.05 KG/M2 | OXYGEN SATURATION: 95 % | SYSTOLIC BLOOD PRESSURE: 112 MMHG | DIASTOLIC BLOOD PRESSURE: 68 MMHG | RESPIRATION RATE: 16 BRPM | HEART RATE: 63 BPM | TEMPERATURE: 97.4 F

## 2020-07-07 VITALS
SYSTOLIC BLOOD PRESSURE: 112 MMHG | HEIGHT: 62 IN | BODY MASS INDEX: 23.19 KG/M2 | RESPIRATION RATE: 16 BRPM | WEIGHT: 126 LBS | HEART RATE: 63 BPM | DIASTOLIC BLOOD PRESSURE: 68 MMHG | TEMPERATURE: 97.4 F | OXYGEN SATURATION: 95 %

## 2020-07-07 ASSESSMENT — PATIENT HEALTH QUESTIONNAIRE - PHQ9
SUM OF ALL RESPONSES TO PHQ QUESTIONS 1-9: 0
2. FEELING DOWN, DEPRESSED OR HOPELESS: 0
SUM OF ALL RESPONSES TO PHQ9 QUESTIONS 1 & 2: 0
SUM OF ALL RESPONSES TO PHQ QUESTIONS 1-9: 0
1. LITTLE INTEREST OR PLEASURE IN DOING THINGS: 0

## 2020-07-07 NOTE — PROGRESS NOTES
Days per week: Not on file     Minutes per session: Not on file    Stress: Not on file   Relationships    Social connections     Talks on phone: Not on file     Gets together: Not on file     Attends Yazidism service: Not on file     Active member of club or organization: Not on file     Attends meetings of clubs or organizations: Not on file     Relationship status: Not on file    Intimate partner violence     Fear of current or ex partner: Not on file     Emotionally abused: Not on file     Physically abused: Not on file     Forced sexual activity: Not on file   Other Topics Concern    Not on file   Social History Narrative    Not on file       Allergies: Penicillins  NF MEDICATIONS REVIEWED    ROS:  See HPI  Constitutional: There are no reports of behavioral issues, change in appetite, fever, or weakness. No gross bleeding issues. Respiratory: denies SOB,   Cardiovascular: denies CP,   GI:  no N/V/D/C. : no reports of change in bladder habits  Extremities: No reports of pain issues, no edema    Physical exam:   /68   Pulse 63   Temp 97.4 °F (36.3 °C)   Resp 16   Ht 5' 2\" (1.575 m)   Wt 126 lb (57.2 kg)   SpO2 95%   BMI 23.05 kg/m²   Constitutional: Awake and alert sitting up in wheel chair pleasantly confused but conversational easily redirected no hyper or reality no hyperkinetic activities  Calm  HEENT: normocephalic, PEAR, MMM, no cyanosis. NO neck mass visualized   Cardiovascular: Regular rate  Respiratory: unlabored respirations, no accessory muscle use noted  GI: abdomen ND  Extremities: no edema,  Psych: pleasant and able to follow simple commands,     ASSESSMENT:     Diagnosis Orders   1. Anxiety     2.  Late onset Alzheimer's disease with behavioral disturbance (HCC)         PLAN:   1.  Stable behaviors no anxiety her PRN anxiety was really for hospice emergency medications were put in place we can discontinued at this time since CMS guidelines require a GDR ordered to be discontinued  2. Recent progression was put on hospice she is stable at this time    Return if symptoms worsen or fail to improve. Pertinent POC, labs, have been reviewed, continue same. Encourage fluids and good nutrition. Stress fall prevention strategies.     JOSEFINA Cooney-CNP      Ernst Mendez, DNP, MSN, RN, GNP-BC, NP-C  Adult/Chuck Nurse Practitioner  [address], [facility]  Department of Geriatrics  8:30am-4:30pm   [phone]  After hours Answering service [phone]

## 2020-07-13 ENCOUNTER — OFFICE VISIT (OUTPATIENT)
Dept: GERIATRIC MEDICINE | Age: 85
End: 2020-07-13
Payer: MEDICARE

## 2020-07-13 PROCEDURE — 1123F ACP DISCUSS/DSCN MKR DOCD: CPT | Performed by: NURSE PRACTITIONER

## 2020-07-13 PROCEDURE — 99309 SBSQ NF CARE MODERATE MDM 30: CPT | Performed by: NURSE PRACTITIONER

## 2020-07-20 VITALS
RESPIRATION RATE: 16 BRPM | WEIGHT: 113 LBS | OXYGEN SATURATION: 98 % | DIASTOLIC BLOOD PRESSURE: 77 MMHG | HEART RATE: 71 BPM | TEMPERATURE: 97.1 F | SYSTOLIC BLOOD PRESSURE: 126 MMHG | BODY MASS INDEX: 20.67 KG/M2

## 2020-07-23 NOTE — PROGRESS NOTES
[address] [facility]. [place], [place]  7/13/2020    Milady Worley  is a 80 y.o. in the NF being seen for a f/u of   Chief Complaint   Patient presents with    1 Month Follow-Up       HPI lives in [address] [place] [address]  Being seen monthly for chronic illnesses. Is incontinent of stool and urine at times some is related to functional incontinence. No recent cough or acute bronchitis no respiratory distress  Is confused at times but does not have anxiety nor bad behaviors, they are nder control    Prealbumin 21    Past Medical History:   Diagnosis Date    COPD (chronic obstructive pulmonary disease) ([facility] [address]     GI bleed      Past Surgical History:   Procedure Laterality Date    APPENDECTOMY      CARPAL TUNNEL RELEASE      [place] INCIS/DRAIN THIGH/KNEE ABSCESS,DEEP Left 11/20/2017    EXCISION AND LAYERED PLASTIC CLOSURE OF BCC LEFT ANTERIOR PICKENS performed by Satnam Carrion MD at [address] [place] [address]       Family History   Problem Relation Age of Onset    Cancer Son         ESOPHAGEAL     Social History     Socioeconomic History    Marital status:       Spouse name: Not on file    Number of children: Not on file    Years of education: Not on file    Highest education level: Not on file   Occupational History    Not on file   Social Needs    Financial resource strain: Not on file    Food insecurity     Worry: Not on file     Inability: Not on file    Transportation needs     Medical: Not on file     Non-medical: Not on file   Tobacco Use    Smoking status: Current Every Day Smoker     Packs/day: 0.50    Smokeless tobacco: Never Used   Substance and Sexual Activity    Alcohol use: No    Drug use: No    Sexual activity: Not on file   Lifestyle    Physical activity     Days per week: Not on file     Minutes per session: Not on file    Stress: Not on file   Relationships    Social connections     Talks on phone: Not on file     Gets together: Not on file     Attends Shinto service: Not on file     Active member of club or organization: Not on file     Attends meetings of clubs or organizations: Not on file     Relationship status: Not on file    Intimate partner violence     Fear of current or ex partner: Not on file     Emotionally abused: Not on file     Physically abused: Not on file     Forced sexual activity: Not on file   Other Topics Concern    Not on file   Social History Narrative    Not on file       Allergies: Penicillins  NF MEDICATIONS REVIEWED    ROS:  See HPI. Uses wheel chair for locomotion in facility. Constitutional: There are no reports of behavioral issues, change in appetite, fever, or increased weakness. No reports of change in vision or hearing  Remains with poor appetite. Respiratory: denies SOB, dyspnea, cough or congestion   Cardiovascular: denies CP, lightheadedness,   GI:  no N/V/D/C. : incontinent of urine, no reports of change in bladder habits  Extremities: No reports of un addressed pain issues, no edema    Physical exam:   /77   Pulse 71   Temp 97.1 °F (36.2 °C)   Resp 16   Wt 113 lb (51.3 kg)   SpO2 98%   BMI 20.67 kg/m²   Constitutional: Awake and alert sitting up in wheel chair  HEENT: normocephalic, PEAR, MMM, no cyanosis. NO neck mass visualized   Cardiovascular: Regular rate  Respiratory: unlabored respirations, no accessory muscle use noted  GI: abdomen ND  Extremities: no ankle edema,  Psych: pleasant and able to follow simple commands, confused    ASSESSMENT:     Diagnosis Orders   1. Ataxia     2. Late onset Alzheimer's disease without behavioral disturbance ([facility] [address]     3. Incontinence of feces, unspecified fecal incontinence type     4. Chronic obstructive pulmonary disease, unspecified COPD type ([address]         PLAN:   1. Up in wheel chair not permitted to walk alone, still cannot walk without falling  2. Stable no recent progression  3. Incontinence continues.    4. No acute bronchitis at this time    Return in about 1 month (around 8/13/2020). Pertinent POC, labs, have been reviewed, continue same. Encourage fluids and good nutrition. Stress fall prevention strategies.     JOSEFINA Santana-CNP      Vanita Leyva DNP, MSN, RN, GNP-BC, NP-C  Adult/Chuck Nurse Practitioner  [address]  Department of Geriatrics  8:30am-4:30pm   [phone]  After hours Answering service [phone]

## 2020-09-17 ENCOUNTER — OFFICE VISIT (OUTPATIENT)
Dept: GERIATRIC MEDICINE | Age: 85
End: 2020-09-17
Payer: MEDICARE

## 2020-09-17 PROCEDURE — 99308 SBSQ NF CARE LOW MDM 20: CPT | Performed by: NURSE PRACTITIONER

## 2020-09-17 PROCEDURE — 1123F ACP DISCUSS/DSCN MKR DOCD: CPT | Performed by: NURSE PRACTITIONER

## 2020-09-26 VITALS
OXYGEN SATURATION: 94 % | WEIGHT: 123 LBS | DIASTOLIC BLOOD PRESSURE: 58 MMHG | RESPIRATION RATE: 13 BRPM | TEMPERATURE: 97.6 F | SYSTOLIC BLOOD PRESSURE: 112 MMHG | BODY MASS INDEX: 22.5 KG/M2 | HEART RATE: 67 BPM

## 2020-09-27 NOTE — PROGRESS NOTES
[facility]. [place], Pon[place] Banuelos [place]  9/17/2020    Galdino Guido  is a 80 y.o. in the [place] being seen for a f/u of   Chief Complaint   Patient presents with    1 Month Follow-Up       HPI lives in [address] [place] [address]  Being [facility] monthly for chronic illnesses. [facility]    Past Medical History:   Diagnosis Date    COPD (chronic obstructive pulmonary disease) ([address]     GI bleed      Past Surgical History:   Procedure Laterality Date    APPENDECTOMY      CARPAL TUNNEL RELEASE      CA INCIS/DRAIN THIGH/KNEE ABSCESS,DEEP Left 11/20/2017    EXCISION AND LAYERED PLASTIC CLOSURE OF BCC LEFT ANTERIOR PICKENS performed by Sophy Navarrete MD at [address] [place] [address] THYROID LOBECTOMY      TONSILLECTOMY AND ADENOIDECTOMY       Family History   Problem Relation Age of Onset    Cancer Son         ESOPHAGEAL     Social History     Socioeconomic History    Marital status:       Spouse name: Not on file    Number of children: Not on file    Years of education: Not on file    Highest education level: Not on file   Occupational History    Not on file   Social Needs    Financial resource strain: Not on file    Food insecurity     Worry: Not on file     Inability: Not on file    Transportation needs     Medical: Not on file     Non-medical: Not on file   Tobacco Use    Smoking status: Current Every Day Smoker     Packs/day: 0.50    Smokeless tobacco: Never Used   Substance and Sexual Activity    Alcohol use: No    Drug use: No    Sexual activity: Not on file   Lifestyle    Physical activity     Days per week: Not on file     Minutes per session: Not on file    Stress: Not on file   Relationships    Social connections     Talks on phone: Not on file     Gets together: Not on file     Attends Jew service: Not on file     Active member of club or organization: Not on file     Attends meetings of clubs or organizations: Not on file     Relationship status: Not on file    Intimate partner violence     Fear of current or ex partner: Not on file     Emotionally abused: Not on file     Physically abused: Not on file     Forced sexual activity: Not on file   Other Topics Concern    Not on file   Social History Narrative    Not on file       Allergies: Penicillins  NF MEDICATIONS REVIEWED    ROS:  See HPI. Uses wheel chair for locomotion in facility. Constitutional: There are no reports of behavioral issues, hs poor appetite, no fever, or increased weakness. No reports of change in vision or hearing. No choking issues. Continues slow decline overall. Respiratory: denies SOB, dyspnea, cough or congestion   Cardiovascular: denies CP, lightheadedness,   GI:  no N/V/D/C. Incontinent at times of stool  : functionally incontinent of urine, no reports of change in bladder habits  Extremities: No reports of un addressed pain issues, no edema    Physical exam:   BP (!) 112/58   Pulse 67   Temp 97.6 °F (36.4 °C)   Resp 13   Wt 123 lb (55.8 kg)   SpO2 94%   BMI 22.50 kg/m²   Constitutional: Awake and alert sitting up in wheel chair  HEENT: normocephalic, PEAR, MMM, no cyanosis. NO neck mass visualized   Cardiovascular: Regular rate  Respiratory: unlabored respirations, no accessory muscle use noted  GI: abdomen ND  Extremities: no ankle edema,  Psych: pleasant and confused, able to follow simple commands,     ASSESSMENT:     Diagnosis Orders   1. Incontinence of feces, unspecified fecal incontinence type     2. Chronic obstructive pulmonary disease, unspecified COPD type ([facility]         PLAN:   1. Probable functional and due to weakness, continues to slowly decline  2. Stable no exacerbation  Remains on hospice  No labs or diagnostics. Return in about 1 month (around 10/17/2020). Pertinent POC, labs, have been reviewed, continue same. Encourage fluids and good nutrition. Stress fall prevention strategies.     Kenyetta Pennington, APRN-CNP      Kenyetta Pennington, DNP, MSN, RN, GNP-BC, NP-C  Adult/[facility] Nurse

## (undated) DEVICE — PENCIL ES L3M BTTN SWCH HOLSTER W/ BLDE ELECTRD EDGE

## (undated) DEVICE — LABEL MED MINI W/ MARKER

## (undated) DEVICE — PAD,ABDOMINAL,8"X10",ST,LF: Brand: MEDLINE

## (undated) DEVICE — SUTURE VCRL SZ 2-0 L36IN ABSRB UD L36MM CT-1 1/2 CIR J945H

## (undated) DEVICE — CONVERTED USE 291618 SPONGE LAPAROTOMY POCKET POUCH RING 18

## (undated) DEVICE — DISCONTINUED USE 393278 SYRINGE 10 ML HYPO W/O NDL LL TP PLSTC ST

## (undated) DEVICE — INTENDED FOR TISSUE SEPARATION, AND OTHER PROCEDURES THAT REQUIRE A SHARP SURGICAL BLADE TO PUNCTURE OR CUT.: Brand: BARD-PARKER ® CARBON RIB-BACK BLADES

## (undated) DEVICE — SYRINGE BLB 50CC IRRIG PLIABLE FNGR FLNG GRAD FLSK DISP

## (undated) DEVICE — MEDI-VAC NON-CONDUCTIVE SUCTION TUBING: Brand: CARDINAL HEALTH

## (undated) DEVICE — SUTURE VCRL + SZ 2-0 L27IN ABSRB WHT SH 1/2 CIR TAPERCUT VCP417H

## (undated) DEVICE — ELECTRODE PT RET AD L9FT HI MOIST COND ADH HYDRGEL CORDED

## (undated) DEVICE — 2000CC GUARDIAN II: Brand: GUARDIAN

## (undated) DEVICE — SKIN MARKER,REGULAR TIP WITH RULER: Brand: DEVON

## (undated) DEVICE — PACK,LAPAROTOMY,NO GOWNS: Brand: MEDLINE

## (undated) DEVICE — SUPER SPONGES,MEDIUM: Brand: KERLIX

## (undated) DEVICE — GOWN,AURORA,NONREINFORCED,LARGE: Brand: MEDLINE

## (undated) DEVICE — TOWEL,OR,DSP,ST,BLUE,STD,4/PK,20PK/CS: Brand: MEDLINE

## (undated) DEVICE — DRESSING,GAUZE,XEROFORM,CURAD,5"X9",ST: Brand: CURAD

## (undated) DEVICE — STANDARD HYPODERMIC NEEDLE,POLYPROPYLENE HUB: Brand: MONOJECT

## (undated) DEVICE — STERILE LATEX POWDER-FREE SURGICAL GLOVESWITH NITRILE COATING: Brand: PROTEXIS

## (undated) DEVICE — TRAY PREP DRY W/ PREM GLV 2 APPL 6 SPNG 2 UNDPD 1 OVERWRAP